# Patient Record
Sex: MALE | Race: WHITE | NOT HISPANIC OR LATINO | Employment: OTHER | URBAN - METROPOLITAN AREA
[De-identification: names, ages, dates, MRNs, and addresses within clinical notes are randomized per-mention and may not be internally consistent; named-entity substitution may affect disease eponyms.]

---

## 2017-02-27 ENCOUNTER — HOSPITAL ENCOUNTER (EMERGENCY)
Facility: CLINIC | Age: 57
Discharge: HOME | End: 2017-02-27
Attending: NURSE PRACTITIONER | Admitting: NURSE PRACTITIONER
Payer: COMMERCIAL

## 2017-02-27 ENCOUNTER — APPOINTMENT (OUTPATIENT)
Dept: CT IMAGING | Facility: CLINIC | Age: 57
End: 2017-02-27
Attending: NURSE PRACTITIONER
Payer: COMMERCIAL

## 2017-02-27 VITALS
OXYGEN SATURATION: 95 % | RESPIRATION RATE: 8 BRPM | SYSTOLIC BLOOD PRESSURE: 124 MMHG | WEIGHT: 240 LBS | DIASTOLIC BLOOD PRESSURE: 83 MMHG | HEART RATE: 105 BPM | BODY MASS INDEX: 33.6 KG/M2 | HEIGHT: 71 IN | TEMPERATURE: 98.2 F

## 2017-02-27 DIAGNOSIS — F17.200 TOBACCO USE DISORDER: ICD-10-CM

## 2017-02-27 DIAGNOSIS — R07.9 CHEST PAIN, UNSPECIFIED TYPE: ICD-10-CM

## 2017-02-27 LAB
ANION GAP SERPL CALCULATED.3IONS-SCNC: 10 MMOL/L (ref 3–14)
BASOPHILS # BLD AUTO: 0.1 10E9/L (ref 0–0.2)
BASOPHILS NFR BLD AUTO: 0.5 %
BUN SERPL-MCNC: 9 MG/DL (ref 7–30)
CALCIUM SERPL-MCNC: 9.2 MG/DL (ref 8.5–10.1)
CHLORIDE SERPL-SCNC: 109 MMOL/L (ref 94–109)
CO2 SERPL-SCNC: 23 MMOL/L (ref 20–32)
CREAT SERPL-MCNC: 0.76 MG/DL (ref 0.66–1.25)
DIFFERENTIAL METHOD BLD: ABNORMAL
EOSINOPHIL # BLD AUTO: 0.1 10E9/L (ref 0–0.7)
EOSINOPHIL NFR BLD AUTO: 0.5 %
ERYTHROCYTE [DISTWIDTH] IN BLOOD BY AUTOMATED COUNT: 12.2 % (ref 10–15)
GFR SERPL CREATININE-BSD FRML MDRD: ABNORMAL ML/MIN/1.7M2
GLUCOSE SERPL-MCNC: 120 MG/DL (ref 70–99)
HCT VFR BLD AUTO: 44.9 % (ref 40–53)
HGB BLD-MCNC: 16.9 G/DL (ref 13.3–17.7)
IMM GRANULOCYTES # BLD: 0.1 10E9/L (ref 0–0.4)
IMM GRANULOCYTES NFR BLD: 0.5 %
INTERPRETATION ECG - MUSE: NORMAL
LYMPHOCYTES # BLD AUTO: 1 10E9/L (ref 0.8–5.3)
LYMPHOCYTES NFR BLD AUTO: 9.3 %
MCH RBC QN AUTO: 31.9 PG (ref 26.5–33)
MCHC RBC AUTO-ENTMCNC: 37.6 G/DL (ref 31.5–36.5)
MCV RBC AUTO: 85 FL (ref 78–100)
MONOCYTES # BLD AUTO: 0.5 10E9/L (ref 0–1.3)
MONOCYTES NFR BLD AUTO: 4.1 %
NEUTROPHILS # BLD AUTO: 9.3 10E9/L (ref 1.6–8.3)
NEUTROPHILS NFR BLD AUTO: 85.1 %
NRBC # BLD AUTO: 0 10*3/UL
NRBC BLD AUTO-RTO: 0 /100
PLATELET # BLD AUTO: 260 10E9/L (ref 150–450)
POTASSIUM SERPL-SCNC: 4 MMOL/L (ref 3.4–5.3)
RBC # BLD AUTO: 5.3 10E12/L (ref 4.4–5.9)
SODIUM SERPL-SCNC: 142 MMOL/L (ref 133–144)
TROPONIN I SERPL-MCNC: NORMAL UG/L (ref 0–0.04)
TROPONIN I SERPL-MCNC: NORMAL UG/L (ref 0–0.04)
WBC # BLD AUTO: 10.9 10E9/L (ref 4–11)

## 2017-02-27 PROCEDURE — 71260 CT THORAX DX C+: CPT

## 2017-02-27 PROCEDURE — 84484 ASSAY OF TROPONIN QUANT: CPT | Performed by: NURSE PRACTITIONER

## 2017-02-27 PROCEDURE — 85025 COMPLETE CBC W/AUTO DIFF WBC: CPT | Performed by: NURSE PRACTITIONER

## 2017-02-27 PROCEDURE — 99285 EMERGENCY DEPT VISIT HI MDM: CPT | Mod: 25

## 2017-02-27 PROCEDURE — 93005 ELECTROCARDIOGRAM TRACING: CPT

## 2017-02-27 PROCEDURE — 25500064 ZZH RX 255 OP 636: Performed by: NURSE PRACTITIONER

## 2017-02-27 PROCEDURE — 80048 BASIC METABOLIC PNL TOTAL CA: CPT | Performed by: NURSE PRACTITIONER

## 2017-02-27 PROCEDURE — 25000125 ZZHC RX 250: Performed by: NURSE PRACTITIONER

## 2017-02-27 RX ORDER — IOPAMIDOL 755 MG/ML
82 INJECTION, SOLUTION INTRAVASCULAR ONCE
Status: COMPLETED | OUTPATIENT
Start: 2017-02-27 | End: 2017-02-27

## 2017-02-27 RX ADMIN — IOPAMIDOL 82 ML: 755 INJECTION, SOLUTION INTRAVENOUS at 16:40

## 2017-02-27 RX ADMIN — SODIUM CHLORIDE 103 ML: 9 INJECTION, SOLUTION INTRAVENOUS at 16:40

## 2017-02-27 ASSESSMENT — ENCOUNTER SYMPTOMS
NUMBNESS: 1
VOMITING: 0
BACK PAIN: 1
ABDOMINAL PAIN: 0
DIAPHORESIS: 0
SHORTNESS OF BREATH: 1
NAUSEA: 0
COUGH: 0
FEVER: 0

## 2017-02-27 NOTE — DISCHARGE INSTRUCTIONS
Discharge Instructions  Chest Pain    You have been seen today for chest pain or discomfort.  At this time, your doctor has found no signs that your chest pain is due to a serious or life-threatening condition, (or you have declined more testing and/or admission to the hospital). However, sometimes there is a serious problem that does not show up right away. Your evaluation today may not be complete and you may need further testing and evaluation.     You need to follow-up with your regular doctor within 3 days.    Return to the Emergency Department if:    Your chest pain changes, gets worse, starts to happen more often, or comes with less activity.    You are short of breath.    You get very weak or tired.    You pass out or faint.    You have any new symptoms, like fever, cough, numb legs, or you cough up blood.    You have anything else that worries you.    Until you follow-up with your regular doctor please do the following:    Take one aspirin daily unless you have an allergy or are told not to by your doctor.    If a stress test appointment has been made, go to the appointment.    If you have questions, contact your regular doctor.    If your doctor today has told you to follow-up with your regular doctor, it is very important that you make an appointment with your clinic and go to the appointment.  If you do not follow-up with your primary doctor, it may result in missing an important development which could result in permanent injury or disability and/or lasting pain.  If there is any problem keeping your appointment, call your doctor or return to the Emergency Department.    If you were given a prescription for medicine here today, be sure to read all of the information (including the package insert) that comes with your prescription.  This will include important information about the medicine, its side effects, and any warnings that you need to know about.  The pharmacist who fills the prescription can  provide more information and answer questions you may have about the medicine.  If you have questions or concerns that the pharmacist cannot address, please call or return to the Emergency Department.     Remember that you can always come back to the Emergency Department if you are not able to see your regular doctor in the amount of time listed above, if you get any new symptoms, or if there is anything that worries you.

## 2017-02-27 NOTE — ED AVS SNAPSHOT
Emergency Department    [address] [place] [address]    Phone:  [phone]    Fax:  [phone]                                       Pierce Sullivan   MRN: [id number]    Department:   Emergency Department   Date of Visit:  2/27/2017           After Visit Summary Signature Page     I have received my discharge instructions, and my questions have been answered. I have discussed any challenges I see with this plan with the nurse or doctor.    ..........................................................................................................................................  Patient/Patient Representative Signature      ..........................................................................................................................................  Patient Representative Print Name and Relationship to Patient    ..................................................               ................................................  Date                                            Time    ..........................................................................................................................................  Reviewed by Signature/Title    ...................................................              ..............................................  Date                                                            Time

## 2017-02-27 NOTE — ED PROVIDER NOTES
History     Chief Complaint:  Chest pain     HPI   Pierce Sullivan is a 56 year old male who presents with waxing and waning sharp left sided chest pain piercing to through the back with associated shortness of breath since late last night. There are no exacerbating or alleviating factors. Additionally, the patient notes a newer onset of numbness with associated tingling in bilateral feet. To note, the patient was recently on a vacation in [place] and returned approximately 10 days ago via plane. The patient does not report any leg swelling, diaphoresis, abdominal pain, nausea, vomiting, fevers, changes in ongoing cough, or other related symptoms. He voices no other concerns at this time. He did take a full strength ASA early this morning repeated by one 81mg ASA later in the morning.     Cardiac/PE/DVT Risk Factors:  The patient has no history of hypertension, hyperlipidemia, diabetes, but he is a smoker. He reports a family history of atrial fibrillation. The patient doesn't have any personal or familial history of PE, DVT, or clotting disorder. The patient reports no recent travel, surgery, or other immobilizations.       Allergies:  Penicillins      Medications:    Norco  Zantac  Percocet   Albuterol  Allegra      Past Medical History:    Cervicalgia   Asthma     Past Surgical History:    Quadricept tear repair   Hernia repair     Family History:    Atrial fibrillation (mother)    Social History:  The patient smokes 1.00 ppd. The patient does not use alcohol.   Marital Status:   [2]     Review of Systems   Constitutional: Negative for diaphoresis and fever.   Respiratory: Positive for shortness of breath. Negative for cough.    Cardiovascular: Positive for chest pain. Negative for leg swelling.   Gastrointestinal: Negative for abdominal pain, nausea and vomiting.   Musculoskeletal: Positive for back pain.   Neurological: Positive for numbness.   All other systems reviewed and are negative.      Physical  "Exam     Patient Vitals for the past 24 hrs:   BP Temp Temp src Pulse Heart Rate Resp SpO2 Height Weight   02/27/17 1730 124/83 - - - 61 8 - - -   02/27/17 1715 - - - - 60 12 95 % - -   02/27/17 1700 133/85 - - - 63 17 98 % - -   02/27/17 1630 - - - - 77 11 - - -   02/27/17 1615 111/76 - - - 69 8 96 % - -   02/27/17 1600 125/81 - - - 64 12 96 % - -   02/27/17 1545 125/88 - - - 67 17 94 % - -   02/27/17 1530 139/89 - - - 85 12 94 % - -   02/27/17 1515 (!) 133/95 - - - 68 16 100 % - -   02/27/17 1506 - - - - - - 96 % - -   02/27/17 1505 133/88 - - - 73 10 - - -   02/27/17 1400 - - - - 87 - 92 % - -   02/27/17 1322 (!) 157/93 - - - 80 13 95 % - -   02/27/17 1311 (!) 156/112 - - - 100 15 - - -   02/27/17 1305 (!) 160/104 98.2  F (36.8  C) Oral 105 - 20 96 % 1.803 m (5' 11\") 108.9 kg (240 lb)      Physical Exam  Physical Exam   Constitutional:   Non toxic appearing. Speaking in full sentences.   Head: Head moves freely with normal range of motion.   ENT: Oropharynx is clear and moist.   Eyes: Conjunctivae pink. EOMs intact.   Neck: Normal range of motion.   Cardiovascular: Normal heart sounds. No concerning murmur. Intact distal pulses: radial pulses 2+ on the right, 2+ on the left. Tachycardic but regular.   Pulmonary/Chest: No respiratory distress. No decreased breath sounds. No wheezes. No rhonchi. No rales. Lungs clear throughout. No obvious dyspnea at rest.  Abdominal: Soft. Non-tender. No rebound, no guarding.   Musculoskeletal: No peripheral edema. Distal capillary refill and sensation intact. No chest wall tenderness to palpation.   Neurological: Oriented to person, place, and time. No focal deficits.   Skin: Skin is warm and pink in color. No rash noted.      Emergency Department Course   ECG (13:02:39):  Rate 106 bpm. CA interval 152. QRS duration 82. QT/QTc 334/443. P-R-T axes 48/63/58. Sinus tachycardia cannot rule out anterior infarct, age undetermined. Interpreted at 1310 by Debra Rios NP*. "     Imaging:  Chest CT, IV contrast only - PE protocol: 1. No visualized pulmonary embolus. 2. No evidence of active pulmonary disease. 3. Nonspecific mildly enlarged right hilar lymph node. Readings per radiology.     Laboratory:  CBC: WNL (WBC 10.9, HGB 16.9, )   BMP: WNL (Creatinine 0.76)  1325 Troponin: WNL <0.015  1655 Troponin: WNL <0.015    Emergency Department Course:  Past medical records, nursing notes, and vitals reviewed. I performed an exam of the patient and obtained history, as documented above. Blood was obtained. A peripheral IV was established in the AC for CT Chest.     ASA 324mg PTA per patient.     Findings and plan explained to the Patient. Patient discharged home with instructions regarding supportive care, medications, and reasons to return. The importance of close follow-up was reviewed. The patient was advised to take 324mg ASA daily until f/u with PCP and stress test completed.      Impression & Plan      Medical Decision Making:  Pierce Sullivan is a 56 year old male with onset of intermittent non exertional left sided CP today. He is a 1ppd smoker and has no hx of HTN, hyperlipidemia personal or family hx of cardiac disease. EKG with sinus tachycardia and he did recently return from a 3 hour flight from [place]. No leg swelling. CT Chest negative for PE or aortic dissection. Troponin negative x 2, 3 hours apart. Given spontaneous resolution of CP, it's intermittent nature and negative work-up here I feel that he is safe to discharge home and f/u with stress echo this week. We reviewed at length reasons to return here and need for clinic follow up. Pt and wife amenable to plan.     Diagnosis:    ICD-10-CM    1. Chest pain, unspecified type R07.9 Exercise Stress Echocardiogram   2. Tobacco use disorder F17.200         Disposition:  discharged to home    Discharge Medication:  Discharge Medication List as of 2/27/2017  6:03 PM          IAmalia, am serving as a scribe  at 1:13 PM on 2/27/2017 to document services personally performed by Debra Rios NP based on my observations and the provider's statements to me.           Debra Rios, APRN CNP  02/27/17 3574

## 2017-02-27 NOTE — ED AVS SNAPSHOT
Emergency Department    [address] [facility] [address]    Phone:  [phone]    Fax:  [phone]                                       Pierce Sullivan   MRN: [id number]    Department:   Emergency Department   Date of Visit:  2/27/2017           Patient Information     Date Of Birth          1960        Your diagnoses for this visit were:     Chest pain, unspecified type     Tobacco use disorder        You were seen by Debra Rios APRN CNP.      Follow-up Information     Follow up with Clinic, [facility] In 4 days.    Why:  as scheduled    Contact information:    [address] [facility], [address]  [facility] [address]  [phone]          Discharge Instructions       Discharge Instructions  Chest Pain    You have been seen today for chest pain or discomfort.  At this time, your doctor has found no signs that your chest pain is due to a serious or life-threatening condition, (or you have declined more testing and/or admission to the hospital). However, sometimes there is a serious problem that does not show up right away. Your evaluation today may not be complete and you may need further testing and evaluation.     You need to follow-up with your regular doctor within 3 days.    Return to the Emergency Department if:    Your chest pain changes, gets worse, starts to happen more often, or comes with less activity.    You are short of breath.    You get very weak or tired.    You pass out or faint.    You have any new symptoms, like fever, cough, numb legs, or you cough up blood.    You have anything else that worries you.    Until you follow-up with your regular doctor please do the following:    Take one aspirin daily unless you have an allergy or are told not to by your doctor.    If a stress test appointment has been made, go to the appointment.    If you have questions, contact your regular doctor.    If your doctor today has told you to follow-up  with your regular doctor, it is very important that you make an appointment with your clinic and go to the appointment.  If you do not follow-up with your primary doctor, it may result in missing an important development which could result in permanent injury or disability and/or lasting pain.  If there is any problem keeping your appointment, call your doctor or return to the Emergency Department.    If you were given a prescription for medicine here today, be sure to read all of the information (including the package insert) that comes with your prescription.  This will include important information about the medicine, its side effects, and any warnings that you need to know about.  The pharmacist who fills the prescription can provide more information and answer questions you may have about the medicine.  If you have questions or concerns that the pharmacist cannot address, please call or return to the Emergency Department.     Remember that you can always come back to the Emergency Department if you are not able to see your regular doctor in the amount of time listed above, if you get any new symptoms, or if there is anything that worries you.          24 Hour Appointment Hotline       To make an appointment at any [facility], call [phone] ([phone]). If you don't have a family doctor or clinic, we will help you find one. [facility] clinics are conveniently located to serve the needs of you and your family.          ED Discharge Orders     Exercise Stress Echocardiogram       The supervising Cardiologist may change the type of echocardiogram requested to answer a specific clinical question based on existing protocols in the Echocardiography laboratory.    Use of contrast is at the discretion of the supervising Cardiologist.                     Review of your medicines      Our records show that you are taking the medicines listed below. If these are incorrect, please call your family doctor or  clinic.        Dose / Directions Last dose taken    ALBUTEROL INHALER 17GM   Quantity:  1 inhaler        1-2 puffs as needed   Refills:  0        ALLEGRA PO        2 TABLET TWICE DAILY   Refills:  0        ibuprofen 200 MG tablet   Commonly known as:  ADVIL/MOTRIN   Dose:  600 mg   Quantity:  1 tablet        Take 3 tablets by mouth every 8 hours as needed for pain.   Refills:  0        ZANTAC PO        Refills:  0                Procedures and tests performed during your visit     Basic metabolic panel    CBC with platelets differential    Chest CT, IV contrast only - PE protocol    EKG 12-lead, tracing only    Troponin I    Troponin I (now)      Orders Needing Specimen Collection     None      Pending Results     Date and Time Order Name Status Description    2/27/2017 1350 Chest CT, IV contrast only - PE protocol Preliminary             Pending Culture Results     No orders found from 2/25/2017 to 2/28/2017.             Test Results from your hospital stay     2/27/2017  2:17 PM - Interface, [email or web address] Results      Component Results     Component Value Ref Range & Units Status    WBC 10.9 4.0 - 11.0 10e9/L Final    RBC Count 5.30 4.4 - 5.9 10e12/L Final    Hemoglobin 16.9 13.3 - 17.7 g/dL Final    Hematocrit 44.9 40.0 - 53.0 % Final    MCV 85 78 - 100 fl Final    MCH 31.9 26.5 - 33.0 pg Final    MCHC 37.6 (H) 31.5 - 36.5 g/dL Final    RDW 12.2 10.0 - 15.0 % Final    Platelet Count 260 150 - 450 10e9/L Final    Diff Method Automated Method  Final    % Neutrophils 85.1 % Final    % Lymphocytes 9.3 % Final    % Monocytes 4.1 % Final    % Eosinophils 0.5 % Final    % Basophils 0.5 % Final    % Immature Granulocytes 0.5 % Final    Nucleated RBCs 0 0 /100 Final    Absolute Neutrophil 9.3 (H) 1.6 - 8.3 10e9/L Final    Absolute Lymphocytes 1.0 0.8 - 5.3 10e9/L Final    Absolute Monocytes 0.5 0.0 - 1.3 10e9/L Final    Absolute Eosinophils 0.1 0.0 - 0.7 10e9/L Final    Absolute Basophils 0.1 0.0 - 0.2 10e9/L Final    Abs  Immature Granulocytes 0.1 0 - 0.4 10e9/L Final    Absolute Nucleated RBC 0.0  Final         2/27/2017  1:49 PM - Interface, Flexilab Results      Component Results     Component Value Ref Range & Units Status    Sodium 142 133 - 144 mmol/L Final    Potassium 4.0 3.4 - 5.3 mmol/L Final    Chloride 109 94 - 109 mmol/L Final    Carbon Dioxide 23 20 - 32 mmol/L Final    Anion Gap 10 3 - 14 mmol/L Final    Glucose 120 (H) 70 - 99 mg/dL Final    Urea Nitrogen 9 7 - 30 mg/dL Final    Creatinine 0.76 0.66 - 1.25 mg/dL Final    GFR Estimate >90  Non  GFR Calc   >60 mL/min/1.7m2 Final    GFR Estimate If Black >90   GFR Calc   >60 mL/min/1.7m2 Final    Calcium 9.2 8.5 - 10.1 mg/dL Final         2/27/2017  1:53 PM - Interface, Flexilab Results      Component Results     Component Value Ref Range & Units Status    Troponin I ES  0.000 - 0.045 ug/L Final    <0.015  The 99th percentile for upper reference range is 0.045 ug/L.  Troponin values in   the range of 0.045 - 0.120 ug/L may be associated with risks of adverse   clinical events.           2/27/2017  4:58 PM - Interface, Radiant Ib      Narrative     CT CHEST WITH CONTRAST   2/27/2017 4:47 PM     HISTORY: Recent travel. Left-sided chest pain.    COMPARISON: None.    TECHNIQUE: Following the uneventful administration of 82 ml Isovue 370  intravenous contrast, helical sections were acquired through the lungs  according to the pulmonary embolism protocol. Coronal reconstructions  were generated. Radiation dose for this scan was reduced using  automated exposure control, adjustment of the mA and/or kV according  to the patient's size, or iterative reconstruction technique.    FINDINGS: No visualized pulmonary embolus. The thoracic aorta is  normal in caliber without dissection. Atherosclerotic calcification in  the thoracic aorta.    The lungs are clear. No pleural or pericardial effusion. Mildly  enlarged 1.8 x 1.4 cm right hilar lymph node  (series 4 image 60).    Scan through the upper abdomen is significant for mild diffuse fatty  infiltration of the liver.        Impression     IMPRESSION:   1. No visualized pulmonary embolus.  2. No evidence of active pulmonary disease.  3. Nonspecific mildly enlarged right hilar lymph node.         2/27/2017  5:34 PM - Interface, Flexilab Results      Component Results     Component Value Ref Range & Units Status    Troponin I ES  0.000 - 0.045 ug/L Final    <0.015  The 99th percentile for upper reference range is 0.045 ug/L.  Troponin values in   the range of 0.045 - 0.120 ug/L may be associated with risks of adverse   clinical events.                  Clinical Quality Measure: Blood Pressure Screening     Your blood pressure was checked while you were in the emergency department today. The last reading we obtained was  BP: 124/83 . Please read the guidelines below about what these numbers mean and what you should do about them.  If your systolic blood pressure (the top number) is less than 120 and your diastolic blood pressure (the bottom number) is less than 80, then your blood pressure is normal. There is nothing more that you need to do about it.  If your systolic blood pressure (the top number) is 120-139 or your diastolic blood pressure (the bottom number) is 80-89, your blood pressure may be higher than it should be. You should have your blood pressure rechecked within a year by a primary care provider.  If your systolic blood pressure (the top number) is 140 or greater or your diastolic blood pressure (the bottom number) is 90 or greater, you may have high blood pressure. High blood pressure is treatable, but if left untreated over time it can put you at risk for heart attack, stroke, or kidney failure. You should have your blood pressure rechecked by a primary care provider within the next 4 weeks.  If your provider in the emergency department today gave you specific instructions to follow-up with your  "doctor or provider even sooner than that, you should follow that instruction and not wait for up to 4 weeks for your follow-up visit.        Thank you for choosing [facility]       Thank you for choosing [facility] for your care. Our goal is always to provide you with excellent care. Hearing back from our patients is one way we can continue to improve our services. Please take a few minutes to complete the written survey that you may receive in the mail after you visit with us. Thank you!        [employer] Information     [employer] lets you send messages to your doctor, view your test results, renew your prescriptions, schedule appointments and more. To sign up, go to [email or web address] . Click on \"Log in\" on the left side of the screen, which will take you to the Welcome page. Then click on \"Sign up Now\" on the right side of the page.     You will be asked to enter the access code listed below, as well as some personal information. Please follow the directions to create your username and password.     Your access code is: [email or web address]  Expires: 2017  5:52 PM     Your access code will  in 90 days. If you need help or a new code, please call your [facility] clinic or [phone].        Care EveryWhere ID     This is your Care EveryWhere ID. This could be used by other organizations to access your [facility] medical records  [id number]        After Visit Summary       This is your record. Keep this with you and show to your community pharmacist(s) and doctor(s) at your next visit.                  "

## 2017-03-08 ENCOUNTER — HOSPITAL ENCOUNTER (OUTPATIENT)
Dept: CARDIOLOGY | Facility: CLINIC | Age: 57
Discharge: HOME | End: 2017-03-08
Attending: NURSE PRACTITIONER | Admitting: NURSE PRACTITIONER
Payer: COMMERCIAL

## 2017-03-08 DIAGNOSIS — R07.9 CHEST PAIN, UNSPECIFIED TYPE: ICD-10-CM

## 2017-03-08 PROCEDURE — 40000264 ECHO STRESS TEST WITH LUMASON

## 2017-03-08 PROCEDURE — 93325 DOPPLER ECHO COLOR FLOW MAPG: CPT | Mod: 26 | Performed by: INTERNAL MEDICINE

## 2017-03-08 PROCEDURE — 93321 DOPPLER ECHO F-UP/LMTD STD: CPT | Mod: 26 | Performed by: INTERNAL MEDICINE

## 2017-03-08 PROCEDURE — 25500064 ZZH RX 255 OP 636: Performed by: NURSE PRACTITIONER

## 2017-03-08 PROCEDURE — 93350 STRESS TTE ONLY: CPT | Mod: 26 | Performed by: INTERNAL MEDICINE

## 2017-03-08 PROCEDURE — 93016 CV STRESS TEST SUPVJ ONLY: CPT | Performed by: INTERNAL MEDICINE

## 2017-03-08 RX ADMIN — SULFUR HEXAFLUORIDE 5 ML: KIT at 10:30

## 2017-07-20 ENCOUNTER — HOSPITAL ENCOUNTER (EMERGENCY)
Facility: CLINIC | Age: 57
Discharge: LEFT BEFORE DISCHARGE | End: 2017-07-20
Admitting: EMERGENCY MEDICINE
Payer: COMMERCIAL

## 2017-07-20 VITALS
BODY MASS INDEX: 28.42 KG/M2 | WEIGHT: 203 LBS | HEIGHT: 71 IN | OXYGEN SATURATION: 98 % | TEMPERATURE: 97.7 F | SYSTOLIC BLOOD PRESSURE: 113 MMHG | DIASTOLIC BLOOD PRESSURE: 74 MMHG | RESPIRATION RATE: 16 BRPM

## 2017-07-20 PROCEDURE — 40000268 ZZH STATISTIC NO CHARGES

## 2018-01-23 ENCOUNTER — SURGERY (OUTPATIENT)
Age: 58
End: 2018-01-23

## 2018-01-23 ENCOUNTER — HOSPITAL ENCOUNTER (OUTPATIENT)
Facility: CLINIC | Age: 58
Discharge: HOME | End: 2018-01-23
Attending: COLON & RECTAL SURGERY | Admitting: COLON & RECTAL SURGERY
Payer: COMMERCIAL

## 2018-01-23 VITALS
BODY MASS INDEX: 27.86 KG/M2 | SYSTOLIC BLOOD PRESSURE: 120 MMHG | RESPIRATION RATE: 15 BRPM | HEIGHT: 71 IN | OXYGEN SATURATION: 97 % | WEIGHT: 199 LBS | DIASTOLIC BLOOD PRESSURE: 84 MMHG

## 2018-01-23 LAB — COLONOSCOPY: NORMAL

## 2018-01-23 PROCEDURE — 88305 TISSUE EXAM BY PATHOLOGIST: CPT | Mod: 26 | Performed by: COLON & RECTAL SURGERY

## 2018-01-23 PROCEDURE — 45385 COLONOSCOPY W/LESION REMOVAL: CPT | Performed by: COLON & RECTAL SURGERY

## 2018-01-23 PROCEDURE — 25000128 H RX IP 250 OP 636: Performed by: COLON & RECTAL SURGERY

## 2018-01-23 PROCEDURE — G0500 MOD SEDAT ENDO SERVICE >5YRS: HCPCS | Performed by: COLON & RECTAL SURGERY

## 2018-01-23 PROCEDURE — 88305 TISSUE EXAM BY PATHOLOGIST: CPT | Performed by: COLON & RECTAL SURGERY

## 2018-01-23 RX ORDER — FENTANYL CITRATE 50 UG/ML
INJECTION, SOLUTION INTRAMUSCULAR; INTRAVENOUS PRN
Status: DISCONTINUED | OUTPATIENT
Start: 2018-01-23 | End: 2018-01-23 | Stop reason: HOSPADM

## 2018-01-23 RX ORDER — ONDANSETRON 2 MG/ML
4 INJECTION INTRAMUSCULAR; INTRAVENOUS EVERY 6 HOURS PRN
Status: DISCONTINUED | OUTPATIENT
Start: 2018-01-23 | End: 2018-01-23 | Stop reason: HOSPADM

## 2018-01-23 RX ORDER — ONDANSETRON 4 MG/1
4 TABLET, ORALLY DISINTEGRATING ORAL EVERY 6 HOURS PRN
Status: DISCONTINUED | OUTPATIENT
Start: 2018-01-23 | End: 2018-01-23 | Stop reason: HOSPADM

## 2018-01-23 RX ORDER — FLUMAZENIL 0.1 MG/ML
0.2 INJECTION, SOLUTION INTRAVENOUS
Status: DISCONTINUED | OUTPATIENT
Start: 2018-01-23 | End: 2018-01-23 | Stop reason: HOSPADM

## 2018-01-23 RX ORDER — PROCHLORPERAZINE MALEATE 10 MG
10 TABLET ORAL EVERY 6 HOURS PRN
Status: DISCONTINUED | OUTPATIENT
Start: 2018-01-23 | End: 2018-01-23 | Stop reason: HOSPADM

## 2018-01-23 RX ORDER — LIDOCAINE 40 MG/G
CREAM TOPICAL
Status: DISCONTINUED | OUTPATIENT
Start: 2018-01-23 | End: 2018-01-23 | Stop reason: HOSPADM

## 2018-01-23 RX ORDER — NALOXONE HYDROCHLORIDE 0.4 MG/ML
.1-.4 INJECTION, SOLUTION INTRAMUSCULAR; INTRAVENOUS; SUBCUTANEOUS
Status: DISCONTINUED | OUTPATIENT
Start: 2018-01-23 | End: 2018-01-23 | Stop reason: HOSPADM

## 2018-01-23 RX ORDER — ONDANSETRON 2 MG/ML
4 INJECTION INTRAMUSCULAR; INTRAVENOUS
Status: DISCONTINUED | OUTPATIENT
Start: 2018-01-23 | End: 2018-01-23 | Stop reason: HOSPADM

## 2018-01-23 RX ADMIN — FENTANYL CITRATE 100 MCG: 50 INJECTION, SOLUTION INTRAMUSCULAR; INTRAVENOUS at 10:10

## 2018-01-23 RX ADMIN — FENTANYL CITRATE 50 MCG: 50 INJECTION, SOLUTION INTRAMUSCULAR; INTRAVENOUS at 10:20

## 2018-01-23 RX ADMIN — MIDAZOLAM 2 MG: 1 INJECTION INTRAMUSCULAR; INTRAVENOUS at 10:11

## 2018-01-23 NOTE — H&P
"Pre-Endoscopy History and Physical   Pierce Sullivan MRN# [id number]   YOB: 1960 Age: 57 year old   Date of Procedure: 2018   Primary care provider: Cam Henderson   Type of Endoscopy: colonoscopy   Reason for Procedure: personal history of polyps   Type of Anesthesia Anticipated: Moderate Sedation   HPI:   Pierce is a 57 year old male with a personal history of polyps.  He last had a colonoscopy in  where a 4mm cecal sessile serrated adenoma and a 11mm descending tubular adenoma were removed.  He denies BRBPR, abdominal pain, nausea/vomiting, changes in bowel habits or unintentional weight loss.  He denies a FH of CRC.    Allergies   Allergen Reactions     Bee Venom      Penicillins      As a child, but has had since      Prior to Admission Medications   Prescriptions Last Dose Informant Patient Reported? Taking?   ALBUTEROL INHALER 17GM More than a month  No No   Si-2 puffs as needed   ibuprofen (ADVIL,MOTRIN) 200 MG tablet 2018  No No   Sig: Take 3 tablets by mouth every 8 hours as needed for pain.      Facility-Administered Medications: None      Patient Active Problem List   Diagnosis     Cervicalgia      Past Medical History:   Diagnosis Date     Allergy      Arthritis     spine and neck     Asthma      Gastroesophageal reflux disease      Ulcer, gastric, acute      Urticaria     Gets hives unknown origin      Past Surgical History:   Procedure Laterality Date     COLONOSCOPY       HERNIA REPAIR       ORTHOPEDIC SURGERY      torn quadrecep repair     Quadracep Tear Right     Repair      Social History   Substance Use Topics     Smoking status: Current Every Day Smoker     Packs/day: 0.50     Years: 30.00     Types: Cigarettes     Smokeless tobacco: Never Used     Alcohol use No      History reviewed. No pertinent family history.   PHYSICAL EXAM:   BP (!) 138/91  Resp 14  Ht 1.803 m (5' 11\")  Wt 90.3 kg (199 lb)  SpO2 98%  BMI 27.75 kg/m2 Estimated body mass index is " "27.75 kg/(m^2) as calculated from the following:    Height as of this encounter: 1.803 m (5' 11\").    Weight as of this encounter: 90.3 kg (199 lb).   RESP: lungs clear to auscultation - no rales, rhonchi or wheezes   CV: regular rates and rhythm   ASA Class 2 - Mild systemic disease    Assessment: 58 y/o gentleman with a personal history of polyps    Plan: Colonoscopy with moderate sedation.  Risks of the procedure were discussed including, but not limited to, bleeding, perforation and missed lesions.  Patient understands and is willing to proceed.    Roberto Gaytan MD ....................  1/23/2018   10:08 AM  Colon and Rectal Surgery Staff  [phone]    "

## 2018-01-24 LAB — COPATH REPORT: NORMAL

## 2018-04-19 ENCOUNTER — HOSPITAL ENCOUNTER (EMERGENCY)
Facility: CLINIC | Age: 58
Discharge: HOME | End: 2018-04-19
Attending: EMERGENCY MEDICINE | Admitting: EMERGENCY MEDICINE
Payer: COMMERCIAL

## 2018-04-19 VITALS
HEIGHT: 71 IN | BODY MASS INDEX: 28 KG/M2 | OXYGEN SATURATION: 95 % | WEIGHT: 200 LBS | TEMPERATURE: 98.5 F | SYSTOLIC BLOOD PRESSURE: 153 MMHG | HEART RATE: 82 BPM | RESPIRATION RATE: 16 BRPM | DIASTOLIC BLOOD PRESSURE: 97 MMHG

## 2018-04-19 DIAGNOSIS — M54.12 CERVICAL RADICULOPATHY: ICD-10-CM

## 2018-04-19 DIAGNOSIS — F10.920 ALCOHOLIC INTOXICATION WITHOUT COMPLICATION (H): ICD-10-CM

## 2018-04-19 LAB — ALCOHOL BREATH TEST: 0.18 (ref 0–0.01)

## 2018-04-19 PROCEDURE — 99283 EMERGENCY DEPT VISIT LOW MDM: CPT

## 2018-04-19 PROCEDURE — 82075 ASSAY OF BREATH ETHANOL: CPT

## 2018-04-19 RX ORDER — NICOTINE 21 MG/24HR
1 PATCH, TRANSDERMAL 24 HOURS TRANSDERMAL ONCE
Status: DISCONTINUED | OUTPATIENT
Start: 2018-04-19 | End: 2018-04-19

## 2018-04-19 RX ORDER — HYDROCODONE BITARTRATE AND ACETAMINOPHEN 5; 325 MG/1; MG/1
1-2 TABLET ORAL EVERY 4 HOURS PRN
Qty: 15 TABLET | Refills: 0 | Status: SHIPPED | OUTPATIENT
Start: 2018-04-19 | End: 2018-06-14

## 2018-04-19 ASSESSMENT — ENCOUNTER SYMPTOMS
BACK PAIN: 1
NUMBNESS: 1

## 2018-04-19 NOTE — ED AVS SNAPSHOT
Emergency Department    [address] [facility] [address]    Phone:  [phone]    Fax:  [phone]                                       Pierce Sullivan   MRN: [id number]    Department:   Emergency Department   Date of Visit:  4/19/2018           Patient Information     Date Of Birth          1960        Your diagnoses for this visit were:     Alcoholic intoxication without complication (H)     Cervical radiculopathy        You were seen by Red Dent MD.      Follow-up Information     Schedule an appointment as soon as possible for a visit with Cam Henderson PA-C.    Specialty:  Physician Assistant    Contact information:    [address]  [address]  [facility] [id number] [phone]          Follow up with  Emergency Department.    Specialty:  EMERGENCY MEDICINE    Why:  If symptoms worsen    Contact information:    [address] [facility] [address] [phone]      Discharge References/Attachments     CERVICAL RADICULOPATHY, UNDERSTANDING (ENGLISH)    ALCOHOL INTOXICATION (ENGLISH)      24 Hour Appointment Hotline       To make an appointment at any [facility], call [phone] ([phone]). If you don't have a family doctor or clinic, we will help you find one. [facility] are conveniently located to serve the needs of you and your family.             Review of your medicines      START taking        Dose / Directions Last dose taken    HYDROcodone-acetaminophen 5-325 MG per tablet   Commonly known as:  NORCO   Dose:  1-2 tablet   Quantity:  15 tablet        Take 1-2 tablets by mouth every 4 hours as needed for pain   Refills:  0          Our records show that you are taking the medicines listed below. If these are incorrect, please call your family doctor or clinic.        Dose / Directions Last dose taken    EPINEPHrine 0.3 MG/0.3ML injection 2-pack   Commonly known as:  EPIPEN/ADRENACLICK/or ANY BX  GENERIC EQUIV        Inject into the muscle as needed   Refills:  0        ibuprofen 200 MG tablet   Commonly known as:  ADVIL/MOTRIN   Dose:  600 mg   Quantity:  1 tablet        Take 3 tablets by mouth every 8 hours as needed for pain.   Refills:  0        ranitidine 150 MG tablet   Commonly known as:  ZANTAC   Dose:  150 mg        Take 150 mg by mouth 2 times daily   Refills:  0        VENTOLIN  (90 Base) MCG/ACT Inhaler   Dose:  2 puff   Generic drug:  albuterol        Inhale 2 puffs into the lungs 4 times daily as needed   Refills:  0                Information about OPIOIDS     PRESCRIPTION OPIOIDS: WHAT YOU NEED TO KNOW   You have a prescription for an opioid (narcotic) pain medicine. Opioids can cause addiction. If you have a history of chemical dependency of any type, you are at a higher risk of becoming addicted to opioids. Only take this medicine after all other options have been tried. Take it for as short a time and as few doses as possible.     Do not:    Drive. If you drive while taking these medicines, you could be arrested for driving under the influence (DUI).    Operate heavy machinery    Do any other dangerous activities while taking these medicines.     Drink any alcohol while taking these medicines.      Take with any other medicines that contain acetaminophen. Read all labels carefully. Look for the word  acetaminophen  or  Tylenol.  Ask your pharmacist if you have questions or are unsure.    Store your pills in a secure place, locked if possible. We will not replace any lost or stolen medicine. If you don t finish your medicine, please throw away (dispose) as directed by your pharmacist. The [place] [employer]ution [employer] has more information about safe disposal: https://www.pca.[facility].[place].us/living-green/managing-unwanted-medications    All opioids tend to cause constipation. Drink plenty of water and eat foods that have a lot of fiber, such as fruits, vegetables, prune juice, apple  juice and high-fiber cereal. Take a laxative (Miralax, milk of magnesia, Colace, Senna) if you don t move your bowels at least every other day.         Prescriptions were sent or printed at these locations (1 Prescription)                   Other Prescriptions                Printed at Department/Unit printer (1 of 1)         HYDROcodone-acetaminophen (NORCO) 5-325 MG per tablet                Procedures and tests performed during your visit     Alcohol breath test POCT      Orders Needing Specimen Collection     None      Pending Results     No orders found from 4/17/2018 to 4/20/2018.            Pending Culture Results     No orders found from 4/17/2018 to 4/20/2018.            Pending Results Instructions     If you had any lab results that were not finalized at the time of your Discharge, you can call the ED Lab Result RN at [phone]. You will be contacted by this team for any positive Lab results or changes in treatment. The nurses are available 7 days a week from 10A to 6:30P.  You can leave a message 24 hours per day and they will return your call.        Test Results From Your Hospital Stay        4/19/2018  3:47 PM      Component Results     Component Value Ref Range & Units Status    Alcohol Breath Test 0.182 (A) 0.00 - 0.01 Final                Clinical Quality Measure: Blood Pressure Screening     Your blood pressure was checked while you were in the emergency department today. The last reading we obtained was  BP: (!) 153/97 . Please read the guidelines below about what these numbers mean and what you should do about them.  If your systolic blood pressure (the top number) is less than 120 and your diastolic blood pressure (the bottom number) is less than 80, then your blood pressure is normal. There is nothing more that you need to do about it.  If your systolic blood pressure (the top number) is 120-139 or your diastolic blood pressure (the bottom number) is 80-89, your blood pressure may be higher  "than it should be. You should have your blood pressure rechecked within a year by a primary care provider.  If your systolic blood pressure (the top number) is 140 or greater or your diastolic blood pressure (the bottom number) is 90 or greater, you may have high blood pressure. High blood pressure is treatable, but if left untreated over time it can put you at risk for heart attack, stroke, or kidney failure. You should have your blood pressure rechecked by a primary care provider within the next 4 weeks.  If your provider in the emergency department today gave you specific instructions to follow-up with your doctor or provider even sooner than that, you should follow that instruction and not wait for up to 4 weeks for your follow-up visit.        Thank you for choosing [facility]       Thank you for choosing [facility] for your care. Our goal is always to provide you with excellent care. Hearing back from our patients is one way we can continue to improve our services. Please take a few minutes to complete the written survey that you may receive in the mail after you visit with us. Thank you!        [employer] Information     [employer] lets you send messages to your doctor, view your test results, renew your prescriptions, schedule appointments and more. To sign up, go to [email or web address] . Click on \"Log in\" on the left side of the screen, which will take you to the Welcome page. Then click on \"Sign up Now\" on the right side of the page.     You will be asked to enter the access code listed below, as well as some personal information. Please follow the directions to create your username and password.     Your access code is: [email or web address]  Expires: 2018  1:39 PM     Your access code will  in 90 days. If you need help or a new code, please call your [facility] clinic or [phone].        Care EveryWhere ID     This is your Care EveryWhere ID. This could be used by other organizations to access your " [facility] medical records  [id number]        Equal Access to Services     CIRILO JAIMES : Hadii jaki Martinez, thomas ayala, cecy ayala, paco morrison. So [facility] [phone].    ATENCIÓN: Si habla español, tiene a collier disposición servicios gratuitos de asistencia lingüística. Llame al [phone].    We comply with applicable federal civil rights laws and [place] laws. We do not discriminate on the basis of race, color, national origin, age, disability, sex, sexual orientation, or gender identity.            After Visit Summary       This is your record. Keep this with you and show to your community pharmacist(s) and doctor(s) at your next visit.

## 2018-04-19 NOTE — ED PROVIDER NOTES
"  History     Chief Complaint:  Alcohol Intoxication    HPI   Pierce Sullivan is a 57 year old male, with a history of alcoholism, alcohol withdrawal seizures, cervicalgia, and arthritis, who presents with his AA friend to the ED for evaluation of alcohol intoxication. Per nurse's report, the patient has been drinking about 1.5 bottles of vodka per day. His last drink was at noon. Upon evaluation, the patient reports he has a history of alcoholism and withdrawal seizures. He had been sober for 10 years. He began drinking for the past week due to pain. He has right arm and fingers numbness with upper back pain. His pain feels like \"a knife in my back\" and \"I'm sleeping on rocks.\" He recently had a neck MRI performed at [facility] which showed severe arthritis per his report. He has an appointment next week with a back specialist. The patient notes he took his last prescribed pain killer today. The patient denies any other substance use.      Allergies:  Penicillins       Medications:    Zantac  Ventolin inhaler     Past Medical History:    Cervicalgia   Asthma   Arthritis  GERD  Gastric ulcer  Urticaria  Alcoholism  Alcohol withdrawal seizure      Past Surgical History:    Right quadriceps tear repair   Hernia repair     Family History:    History reviewed. No pertinent family history.     Social History:  Smoking status: Current every day smoker  Alcohol use: Yes, 1.5 bottles vodka/day, history of alcoholism, sobriety 10 years   Presents to ED with AA friend    Marital Status:   [2]     Review of Systems   Musculoskeletal: Positive for back pain.   Neurological: Positive for numbness.   All other systems reviewed and are negative.    Physical Exam     Patient Vitals for the past 24 hrs:   BP Temp Pulse Heart Rate Resp SpO2 Height Weight   04/19/18 1720 - - - 85 16 95 % - -   04/19/18 1534 (!) 153/97 98.5  F (36.9  C) 82 82 16 94 % 1.803 m (5' 11\") 90.7 kg (200 lb)     Physical Exam  Nursing note and " vitals reviewed.  Constitutional:  Smells of alcohol. Oriented to person, place, and time. Cooperative.   HENT:   Nose:    Nose normal.   Mouth/Throat:   Mucous membranes are normal.   Eyes:    Conjunctivae normal and EOM are normal.      Pupils are equal, round, and reactive to light.   Neck:    Trachea normal.   Cardiovascular:  Normal rate, regular rhythm, normal heart sounds and normal pulses. No murmur heard.  Pulmonary/Chest:  Effort normal and breath sounds normal.   Abdominal:   Soft. Normal appearance and bowel sounds are normal.      There is no tenderness.      There is no rebound and no CVA tenderness.   Musculoskeletal:  Extremities atraumatic x 4.   Lymphadenopathy:  No cervical adenopathy.   Neurological:   Decreased sensation to light touch in fingertips of right index and middle fingers. Sensation intact elsewhere. Alert and oriented to person, place, and time.      No cranial nerve deficit or sensory deficit. GCS eye subscore is 4. GCS verbal subscore is 5. GCS motor subscore is 6. No tremors present.  Skin:    Skin is intact. No rash noted.   Psychiatric:   Slightly agitated. Slightly depressed.     Emergency Department Course     Laboratory:  Alcohol breath test POCT: 0.182    Emergency Department Course:  Past medical records, nursing notes, and vitals reviewed.  1546: I performed an exam of the patient and obtained history, as documented above.    1700: I rechecked the patient. Explained plan with patient and friend.    Findings and plan explained to the Patient and friend. Patient discharged home with instructions regarding supportive care, medications, and reasons to return. The importance of close follow-up was reviewed.     Impression & Plan      Medical Decision Making:  This is a 57-year-old male who came in concerned that he would go into severe alcohol withdrawal based on his previous history.  Here for me, he does not appear to be in alcohol withdrawal.  He also does not appear overly  intoxicated.  I explained to him that the fact that he has only been drinking now for a week would make it less likely for him to go into severe withdrawal.  We looked for a detox bed for the patient, and the only place in [facility] with a detox bed was [address] [place] [address].  The patient was not willing to go there, which did not surprise me.  I tried to reassure him that he would probably be safe to go home with regards to the withdrawal.  However I am concerned about him continuing to drink due to his pain.  I suspect that he has a cervical radiculopathy and likely a herniated disc.  I discussed with him and his friend who brought him about whether or not he should go home with some pain medicine.  They both agree that they will give the prescription to his wife to manage, as I indicated I did not want him taking the pain medicine with alcohol.  However I also and hoping that he will discontinue drinking if he receives some pain medicine.  Therefore at this point he was discharged.  I instructed him to return if he develops any withdrawal symptoms.  He should follow-up with his primary physician as well.    Diagnosis:    ICD-10-CM   1. Alcoholic intoxication without complication (H) F10.920   2. Cervical radiculopathy M54.12     Disposition: Patient discharged with AA friend     Discharge Medications:   Details   HYDROcodone-acetaminophen (NORCO) 5-325 MG per tablet Take 1-2 tablets by mouth every 4 hours as needed for pain, Disp-15 tablet, R-0, Local Print     Yesi Souza  4/19/2018    EMERGENCY DEPARTMENT    I, Yesi Souza, am serving as a scribe at 4:46 PM on 4/19/2018 to document services personally performed by Red Dent MD based on my observations and the provider's statements to me.          Red Dent MD  04/19/18 5137

## 2018-04-19 NOTE — ED AVS SNAPSHOT
Emergency Department    [address] [place] [address]    Phone:  [phone]    Fax:  [phone]                                       Pierce Sullivan   MRN: [id number]    Department:   Emergency Department   Date of Visit:  4/19/2018           After Visit Summary Signature Page     I have received my discharge instructions, and my questions have been answered. I have discussed any challenges I see with this plan with the nurse or doctor.    ..........................................................................................................................................  Patient/Patient Representative Signature      ..........................................................................................................................................  Patient Representative Print Name and Relationship to Patient    ..................................................               ................................................  Date                                            Time    ..........................................................................................................................................  Reviewed by Signature/Title    ...................................................              ..............................................  Date                                                            Time

## 2018-06-14 ENCOUNTER — OFFICE VISIT (OUTPATIENT)
Dept: FAMILY MEDICINE | Facility: CLINIC | Age: 58
End: 2018-06-14
Payer: COMMERCIAL

## 2018-06-14 ENCOUNTER — RADIANT APPOINTMENT (OUTPATIENT)
Dept: GENERAL RADIOLOGY | Facility: CLINIC | Age: 58
End: 2018-06-14
Attending: INTERNAL MEDICINE
Payer: COMMERCIAL

## 2018-06-14 VITALS
BODY MASS INDEX: 28.14 KG/M2 | HEART RATE: 82 BPM | WEIGHT: 201 LBS | DIASTOLIC BLOOD PRESSURE: 98 MMHG | OXYGEN SATURATION: 96 % | HEIGHT: 71 IN | TEMPERATURE: 99.1 F | SYSTOLIC BLOOD PRESSURE: 153 MMHG

## 2018-06-14 DIAGNOSIS — I10 ESSENTIAL HYPERTENSION: ICD-10-CM

## 2018-06-14 DIAGNOSIS — F17.210 CIGARETTE NICOTINE DEPENDENCE WITHOUT COMPLICATION: ICD-10-CM

## 2018-06-14 DIAGNOSIS — G89.29 CHRONIC NECK AND BACK PAIN: ICD-10-CM

## 2018-06-14 DIAGNOSIS — M25.512 LEFT SHOULDER PAIN, UNSPECIFIED CHRONICITY: ICD-10-CM

## 2018-06-14 DIAGNOSIS — Z13.220 LIPID SCREENING: ICD-10-CM

## 2018-06-14 DIAGNOSIS — R31.9 HEMATURIA, UNSPECIFIED TYPE: ICD-10-CM

## 2018-06-14 DIAGNOSIS — M54.9 CHRONIC NECK AND BACK PAIN: ICD-10-CM

## 2018-06-14 DIAGNOSIS — Z00.00 ROUTINE HISTORY AND PHYSICAL EXAMINATION OF ADULT: Primary | ICD-10-CM

## 2018-06-14 DIAGNOSIS — M54.2 CHRONIC NECK AND BACK PAIN: ICD-10-CM

## 2018-06-14 DIAGNOSIS — Z12.5 SCREENING FOR PROSTATE CANCER: ICD-10-CM

## 2018-06-14 DIAGNOSIS — Z11.59 NEED FOR HEPATITIS C SCREENING TEST: ICD-10-CM

## 2018-06-14 LAB
ALBUMIN UR-MCNC: NEGATIVE MG/DL
APPEARANCE UR: CLEAR
BILIRUB UR QL STRIP: NEGATIVE
COLOR UR AUTO: YELLOW
GLUCOSE UR STRIP-MCNC: NEGATIVE MG/DL
HGB UR QL STRIP: ABNORMAL
KETONES UR STRIP-MCNC: NEGATIVE MG/DL
LEUKOCYTE ESTERASE UR QL STRIP: NEGATIVE
NITRATE UR QL: NEGATIVE
PH UR STRIP: 5.5 PH (ref 5–7)
RBC #/AREA URNS AUTO: NORMAL /HPF
SOURCE: ABNORMAL
SP GR UR STRIP: <=1.005 (ref 1–1.03)
UROBILINOGEN UR STRIP-ACNC: 0.2 EU/DL (ref 0.2–1)
WBC #/AREA URNS AUTO: NORMAL /HPF

## 2018-06-14 PROCEDURE — 86803 HEPATITIS C AB TEST: CPT | Performed by: INTERNAL MEDICINE

## 2018-06-14 PROCEDURE — 80061 LIPID PANEL: CPT | Performed by: INTERNAL MEDICINE

## 2018-06-14 PROCEDURE — 80048 BASIC METABOLIC PNL TOTAL CA: CPT | Performed by: INTERNAL MEDICINE

## 2018-06-14 PROCEDURE — 73030 X-RAY EXAM OF SHOULDER: CPT | Mod: LT

## 2018-06-14 PROCEDURE — 99386 PREV VISIT NEW AGE 40-64: CPT | Performed by: INTERNAL MEDICINE

## 2018-06-14 PROCEDURE — 81001 URINALYSIS AUTO W/SCOPE: CPT | Performed by: INTERNAL MEDICINE

## 2018-06-14 PROCEDURE — 36415 COLL VENOUS BLD VENIPUNCTURE: CPT | Performed by: INTERNAL MEDICINE

## 2018-06-14 PROCEDURE — G0103 PSA SCREENING: HCPCS | Performed by: INTERNAL MEDICINE

## 2018-06-14 RX ORDER — GABAPENTIN 300 MG/1
CAPSULE ORAL
Refills: 0 | COMMUNITY
Start: 2018-05-09 | End: 2018-07-03

## 2018-06-14 RX ORDER — OXYCODONE AND ACETAMINOPHEN 10; 325 MG/1; MG/1
1 TABLET ORAL EVERY 6 HOURS PRN
Qty: 60 TABLET | Refills: 0 | Status: SHIPPED | OUTPATIENT
Start: 2018-06-14 | End: 2018-07-03

## 2018-06-14 RX ORDER — EPINEPHRINE 0.3 MG/.3ML
0.3 INJECTION SUBCUTANEOUS PRN
COMMUNITY

## 2018-06-14 RX ORDER — VARENICLINE TARTRATE 1 MG/1
1 TABLET, FILM COATED ORAL 2 TIMES DAILY
Qty: 56 TABLET | Refills: 2 | Status: SHIPPED | OUTPATIENT
Start: 2018-06-14 | End: 2020-02-04

## 2018-06-14 RX ORDER — METHOCARBAMOL 750 MG/1
TABLET, FILM COATED ORAL
Refills: 0 | COMMUNITY
Start: 2018-05-30 | End: 2018-07-03

## 2018-06-14 RX ORDER — HYDROCHLOROTHIAZIDE 25 MG/1
25 TABLET ORAL DAILY
Qty: 30 TABLET | Refills: 3 | Status: SHIPPED | OUTPATIENT
Start: 2018-06-14 | End: 2020-02-04

## 2018-06-14 ASSESSMENT — ENCOUNTER SYMPTOMS
HEADACHES: 1
CONSTIPATION: 0
PALPITATIONS: 0
ARTHRALGIAS: 0
COUGH: 0
DIZZINESS: 0
BLOOD IN STOOL: 0
DYSURIA: 0
NUMBNESS: 1
NERVOUS/ANXIOUS: 0
ABDOMINAL PAIN: 0
WEAKNESS: 1
FATIGUE: 1
FEVER: 0
LIGHT-HEADEDNESS: 0
DIFFICULTY URINATING: 0
BACK PAIN: 1
HEMATURIA: 1
NAUSEA: 0
EYE PAIN: 0
VOMITING: 0
SLEEP DISTURBANCE: 1
MYALGIAS: 0
TROUBLE SWALLOWING: 0
CHILLS: 0
SORE THROAT: 0
DIARRHEA: 0
SHORTNESS OF BREATH: 1
NECK PAIN: 1

## 2018-06-14 NOTE — LETTER
[facility]  [address] [place] [address] [facility]  [address]  [place]  [address]  Tel: [phone]    June 18, 2018    Pierce Sullivan  [address] [address]        Good day to you Mr. Sullivan.     Your Hepatitis C and prostate cancer screening tests are normal.     The microscopic examination of your urine did not show any blood.     Your metabolic profile shows that your electrolytes, blood sugar (glucose), and kidney function are within normal limits.     Your cholesterol levels are excellent.  Keep up the good work.       Sincerely,    Charles Gardner MD/oskar    Results for orders placed or performed in visit on 06/14/18   Lipid panel reflex to direct LDL Fasting   Result Value Ref Range    Cholesterol 191 <200 mg/dL    Triglycerides 75 <150 mg/dL    HDL Cholesterol 78 >39 mg/dL    LDL Cholesterol Calculated 98 <100 mg/dL    Non HDL Cholesterol 113 <130 mg/dL   Hepatitis C antibody   Result Value Ref Range    Hepatitis C Antibody Nonreactive NR^Nonreactive   Prostate spec antigen screen   Result Value Ref Range    PSA 0.61 0 - 4 ug/L   Basic metabolic panel   Result Value Ref Range    Sodium 140 133 - 144 mmol/L    Potassium 3.9 3.4 - 5.3 mmol/L    Chloride 108 94 - 109 mmol/L    Carbon Dioxide 20 20 - 32 mmol/L    Anion Gap 12 3 - 14 mmol/L    Glucose 92 70 - 99 mg/dL    Urea Nitrogen 10 7 - 30 mg/dL    Creatinine 0.66 0.66 - 1.25 mg/dL    GFR Estimate >90 >60 mL/min/1.7m2    GFR Estimate If Black >90 >60 mL/min/1.7m2    Calcium 9.5 8.5 - 10.1 mg/dL   UA reflex to Microscopic and Culture   Result Value Ref Range    Color Urine Yellow     Appearance Urine Clear     Glucose Urine Negative NEG^Negative mg/dL    Bilirubin Urine Negative NEG^Negative    Ketones Urine Negative NEG^Negative mg/dL    Specific Gravity Urine <=1.005 1.003 - 1.035    Blood Urine Trace (A) NEG^Negative    pH Urine 5.5 5.0 - 7.0 pH    Protein Albumin Urine Negative NEG^Negative mg/dL    Urobilinogen Urine 0.2 0.2 - 1.0 EU/dL     Nitrite Urine Negative NEG^Negative    Leukocyte Esterase Urine Negative NEG^Negative    Source Midstream Urine    Urine Microscopic   Result Value Ref Range    WBC Urine 0 - 5 OTO5^0 - 5 /HPF    RBC Urine O - 2 OTO2^O - 2 /HPF           Enclosure: Lab Results

## 2018-06-14 NOTE — PATIENT INSTRUCTIONS
Take your blood pressure medication (hydrochlorothiazide) as directed.    Monitor your blood pressure twice a day (once you wake up and before bedtime).  Call doctor if:  -- your blood pressure for the top/upper number is greater than 140 or less than 90  -- your blood pressure for the bottom/lower number is greater than 90 or less than 60    Return to the clinic for a nurse-only visit in 1-2 weeks to recheck your blood pressure. Bring your blood pressure machine.    Call doctor if you develop any side effects from the medication/s.    Seek immediate medical attention if you develop marked weakness of the arms or legs, loss of bowel/urinary control, or numbness such at your groin.    Proceed with physical therapy for your left shoulder pain.    Follow up in 1 month.          Preventive Health Recommendations  Male Ages 50   64    Yearly exam:             See your health care provider every year in order to  o   Review health changes.   o   Discuss preventive care.    o   Review your medicines if your doctor has prescribed any.     Have a cholesterol test every 5 years, or more frequently if you are at risk for high cholesterol/heart disease.     Have a diabetes test (fasting glucose) every three years. If you are at risk for diabetes, you should have this test more often.     Have a colonoscopy at age 50, or have a yearly FIT test (stool test). These exams will check for colon cancer.      Talk with your health care provider about whether or not a prostate cancer screening test (PSA) is right for you.    You should be tested each year for STDs (sexually transmitted diseases), if you re at risk.     Shots: Get a flu shot each year. Get a tetanus shot every 10 years.     Nutrition:    Eat at least 5 servings of fruits and vegetables daily.     Eat whole-grain bread, whole-wheat pasta and brown rice instead of white grains and rice.     Talk to your provider about Calcium and Vitamin D.     Lifestyle    Exercise for at  least 150 minutes a week (30 minutes a day, 5 days a week). This will help you control your weight and prevent disease.     Limit alcohol to one drink per day.     No smoking.     Wear sunscreen to prevent skin cancer.     See your dentist every six months for an exam and cleaning.     See your eye doctor every 1 to 2 years.
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
## 2018-06-14 NOTE — MR AVS SNAPSHOT
After Visit Summary   6/14/2018    Pierce Sullivan    MRN: [id number]           Patient Information     Date Of Birth          1960        Visit Information        Provider Department      6/14/2018 4:00 PM Charles Gardner MD [facility]        Today's Diagnoses     Routine history and physical examination of adult    -  1    Chronic neck and back pain        Essential hypertension        Left shoulder pain, unspecified chronicity        Hematuria, unspecified type        Cigarette nicotine dependence without complication        Screening for prostate cancer        Lipid screening        Need for hepatitis C screening test          Care Instructions    Take your blood pressure medication (hydrochlorothiazide) as directed.    Monitor your blood pressure twice a day (once you wake up and before bedtime).  Call doctor if:  -- your blood pressure for the top/upper number is greater than 140 or less than 90  -- your blood pressure for the bottom/lower number is greater than 90 or less than 60    Return to the clinic for a nurse-only visit in 1-2 weeks to recheck your blood pressure. Bring your blood pressure machine.    Call doctor if you develop any side effects from the medication/s.    Seek immediate medical attention if you develop marked weakness of the arms or legs, loss of bowel/urinary control, or numbness such at your groin.    Proceed with physical therapy for your left shoulder pain.    Follow up in 1 month.          Preventive Health Recommendations  Male Ages 50 - 64    Yearly exam:             See your health care provider every year in order to  o   Review health changes.   o   Discuss preventive care.    o   Review your medicines if your doctor has prescribed any.     Have a cholesterol test every 5 years, or more frequently if you are at risk for high cholesterol/heart disease.     Have a diabetes test (fasting glucose) every three years. If you are at risk  for diabetes, you should have this test more often.     Have a colonoscopy at age 50, or have a yearly FIT test (stool test). These exams will check for colon cancer.      Talk with your health care provider about whether or not a prostate cancer screening test (PSA) is right for you.    You should be tested each year for STDs (sexually transmitted diseases), if you re at risk.     Shots: Get a flu shot each year. Get a tetanus shot every 10 years.     Nutrition:    Eat at least 5 servings of fruits and vegetables daily.     Eat whole-grain bread, whole-wheat pasta and brown rice instead of white grains and rice.     Talk to your provider about Calcium and Vitamin D.     Lifestyle    Exercise for at least 150 minutes a week (30 minutes a day, 5 days a week). This will help you control your weight and prevent disease.     Limit alcohol to one drink per day.     No smoking.     Wear sunscreen to prevent skin cancer.     See your dentist every six months for an exam and cleaning.     See your eye doctor every 1 to 2 years.            Follow-ups after your visit        Additional Services     [facility] PT, HAND, AND CHIROPRACTIC REFERRAL       **This order will print in the [facility] Scheduling Office**    Physical Therapy, Hand Therapy and Chiropractic Care are available through:    *[facility]  *[facility]  *[facility]    Call one number to schedule at any of the above locations: [phone].    Your provider has referred you to: Physical Therapy at [facility] or [facility]    Indication/Reason for Referral: Shoulder Pain  Onset of Illness: 1 month  Therapy Orders: Evaluate and Treat  Special Programs: as recommended by therapist    Special Request:     Kem Brownlee      Additional Comments for the Therapist or Chiropractor:     Please be aware that coverage of these services is subject to the terms and limitations of your health insurance plan.  Call member services at your health  "plan with any benefit or coverage questions.      Please bring the following to your appointment:    *Your personal calendar for scheduling future appointments  *Comfortable clothing                  Who to contact     If you have questions or need follow up information about today's clinic visit or your schedule please contact [facility] directly at [phone].  Normal or non-critical lab and imaging results will be communicated to you by MyChart, letter or phone within 4 business days after the clinic has received the results. If you do not hear from us within 7 days, please contact the clinic through MyChart or phone. If you have a critical or abnormal lab result, we will notify you by phone as soon as possible.  Submit refill requests through [employer] or call your pharmacy and they will forward the refill request to us. Please allow 3 business days for your refill to be completed.          Additional Information About Your Visit        MyChart Information     [employer] lets you send messages to your doctor, view your test results, renew your prescriptions, schedule appointments and more. To sign up, go to [email or web address] . Click on \"Log in\" on the left side of the screen, which will take you to the Welcome page. Then click on \"Sign up Now\" on the right side of the page.     You will be asked to enter the access code listed below, as well as some personal information. Please follow the directions to create your username and password.     Your access code is: [email or web address]  Expires: 2018  1:39 PM     Your access code will  in 90 days. If you need help or a new code, please call your [place] clinic or [phone].        Care EveryWhere ID     This is your Care EveryWhere ID. This could be used by other organizations to access your [place] medical records  [id number]        Your Vitals Were     Pulse Temperature Height Pulse Oximetry BMI (Body Mass Index)       82 99.1  F (37.3  C) " "(Tympanic) 5' 11\" (1.803 m) 96% 28.03 kg/m2        Blood Pressure from Last 3 Encounters:   06/14/18 (!) 153/98   04/19/18 (!) 153/97   01/23/18 120/84    Weight from Last 3 Encounters:   06/14/18 201 lb (91.2 kg)   04/19/18 200 lb (90.7 kg)   01/23/18 199 lb (90.3 kg)              We Performed the Following     Basic metabolic panel     Hepatitis C antibody     [facility] PT, HAND, AND CHIROPRACTIC REFERRAL     Lipid panel reflex to direct LDL Fasting     Prostate spec antigen screen     UA reflex to Microscopic and Culture          Today's Medication Changes          These changes are accurate as of 6/14/18  5:31 PM.  If you have any questions, ask your nurse or doctor.               Start taking these medicines.        Dose/Directions    Blood Pressure Monitoring Kit   Used for:  Essential hypertension   Started by:  Charles Gardner MD        Use as directed   Quantity:  1 kit   Refills:  0       hydrochlorothiazide 25 MG tablet   Commonly known as:  HYDRODIURIL   Used for:  Essential hypertension   Started by:  Charles Gardner MD        Dose:  25 mg   Take 1 tablet (25 mg) by mouth daily   Quantity:  30 tablet   Refills:  3       oxyCODONE-acetaminophen  MG per tablet   Commonly known as:  PERCOCET   Used for:  Chronic neck and back pain   Started by:  Charles Gardner MD        Dose:  1 tablet   Take 1 tablet by mouth every 6 hours as needed for severe pain   Quantity:  60 tablet   Refills:  0       * varenicline 0.5 MG X 11 & 1 MG X 42 tablet   Commonly known as:  CHANTIX STARTING MONTH PAK   Used for:  Cigarette nicotine dependence without complication   Started by:  Charles Gardner MD        Take 0.5 mg tab daily for 3 days, then 0.5 mg tab twice daily for 4 days, then 1 mg twice daily.   Quantity:  53 tablet   Refills:  0       * varenicline 1 MG tablet   Commonly known as:  CHANTIX   Used for:  Cigarette nicotine dependence without " complication   Started by:  Charles Gardner MD        Dose:  1 mg   Take 1 tablet (1 mg) by mouth 2 times daily   Quantity:  56 tablet   Refills:  2       * Notice:  This list has 2 medication(s) that are the same as other medications prescribed for you. Read the directions carefully, and ask your doctor or other care provider to review them with you.      These medicines have changed or have updated prescriptions.        Dose/Directions    EPINEPHrine 0.3 MG/0.3ML injection 2-pack   Commonly known as:  EPIPEN/ADRENACLICK/or ANY BX GENERIC EQUIV   This may have changed:  Another medication with the same name was removed. Continue taking this medication, and follow the directions you see here.   Changed by:  Charles Gardner MD        Dose:  0.3 mg   Inject 0.3 mg into the muscle as needed for anaphylaxis   Refills:  0         Stop taking these medicines if you haven't already. Please contact your care team if you have questions.     HYDROcodone-acetaminophen 5-325 MG per tablet   Commonly known as:  NORCO   Stopped by:  Charles Gardner MD           ibuprofen 200 MG tablet   Commonly known as:  ADVIL/MOTRIN   Stopped by:  Charles Gardner MD           ranitidine 150 MG tablet   Commonly known as:  ZANTAC   Stopped by:  Charles Gardner MD           VENTOLIN  (90 Base) MCG/ACT Inhaler   Generic drug:  albuterol   Stopped by:  Charles Gardner MD                Where to get your medicines      These medications were sent to [facility] [address] [place] [facility] [place] - [address]  [address] [address]     Phone:  [phone]     hydrochlorothiazide 25 MG tablet    varenicline 0.5 MG X 11 & 1 MG X 42 tablet    varenicline 1 MG tablet         Some of these will need a paper prescription and others can be bought over the counter.  Ask your nurse if you have questions.     Bring a paper prescription for each  of these medications     Blood Pressure Monitoring Kit    oxyCODONE-acetaminophen  MG per tablet               Information about OPIOIDS     PRESCRIPTION OPIOIDS: WHAT YOU NEED TO KNOW   We gave you an opioid (narcotic) pain medicine. It is important to manage your pain, but opioids are not always the best choice. You should first try all the other options your care team gave you. Take this medicine for as short a time (and as few doses) as possible.     These medicines have risks:    DO NOT drive when on new or higher doses of pain medicine. These medicines can affect your alertness and reaction times, and you could be arrested for driving under the influence (DUI). If you need to use opioids long-term, talk to your care team about driving.    DO NOT operate heave machinery    DO NOT do any other dangerous activities while taking these medicines.     DO NOT drink any alcohol while taking these medicines.      If the opioid prescribed includes acetaminophen, DO NOT take with any other medicines that contain acetaminophen. Read all labels carefully. Look for the word  acetaminophen  or  Tylenol.  Ask your pharmacist if you have questions or are unsure.    You can get addicted to pain medicines, especially if you have a history of addiction (chemical, alcohol or substance dependence). Talk to your care team about ways to reduce this risk.    Store your pills in a secure place, locked if possible. We will not replace any lost or stolen medicine. If you don t finish your medicine, please throw away (dispose) as directed by your pharmacist. The [place] Pollution Control Agency has more information about safe disposal: https://www.pca.[facility].[place].us/living-green/managing-unwanted-medications.     All opioids tend to cause constipation. Drink plenty of water and eat foods that have a lot of fiber, such as fruits, vegetables, prune juice, apple juice and high-fiber cereal. Take a laxative (Miralax, milk of magnesia,  Viki Mcallister) if you don t move your bowels at least every other day.          Primary Care Provider Office Phone # Fax #    Cam Henderson PA-C [phone]       [facility] [address]  [facility] [address]        Equal Access to Services     CIRILO JAIMES : Hadii aad ku hadasho Soomaali, waaxda luqadaha, qaybta kaalmada adeegyada, waxay idiin hayaan adeeg khludmilacoco laamilcar . So [facility] [phone].    ATENCIÓN: Si habla español, tiene a collier disposición servicios gratuitos de asistencia lingüística. Sandra al [phone].    We comply with applicable federal civil rights laws and [place] laws. We do not discriminate on the basis of race, color, national origin, age, disability, sex, sexual orientation, or gender identity.            Thank you!     Thank you for choosing [facility]  for your care. Our goal is always to provide you with excellent care. Hearing back from our patients is one way we can continue to improve our services. Please take a few minutes to complete the written survey that you may receive in the mail after your visit with us. Thank you!             Your Updated Medication List - Protect others around you: Learn how to safely use, store and throw away your medicines at www.disposemymeds.org.          This list is accurate as of 6/14/18  5:31 PM.  Always use your most recent med list.                   Brand Name Dispense Instructions for use Diagnosis    Blood Pressure Monitoring Kit     1 kit    Use as directed    Essential hypertension       EPINEPHrine 0.3 MG/0.3ML injection 2-pack    EPIPEN/ADRENACLICK/or ANY BX GENERIC EQUIV     Inject 0.3 mg into the muscle as needed for anaphylaxis        gabapentin 300 MG capsule    NEURONTIN     TAKE ONE CAPSULE BY MOUTH DAILY FOR 3 DAYS, THEN TWICE DAILY FOR 3 DAYS, THEN THREE TIMES DAILY        hydrochlorothiazide 25 MG tablet    HYDRODIURIL    30 tablet    Take 1 tablet (25 mg) by mouth daily     Essential hypertension       methocarbamol 750 MG tablet    ROBAXIN     TAKE 1 TABLET BY MOUTH AT NIGHT FOR SPASMS        oxyCODONE-acetaminophen  MG per tablet    PERCOCET    60 tablet    Take 1 tablet by mouth every 6 hours as needed for severe pain    Chronic neck and back pain       * varenicline 0.5 MG X 11 & 1 MG X 42 tablet    CHANTIX STARTING MONTH JUSTIN    53 tablet    Take 0.5 mg tab daily for 3 days, then 0.5 mg tab twice daily for 4 days, then 1 mg twice daily.    Cigarette nicotine dependence without complication       * varenicline 1 MG tablet    CHANTIX    56 tablet    Take 1 tablet (1 mg) by mouth 2 times daily    Cigarette nicotine dependence without complication       * Notice:  This list has 2 medication(s) that are the same as other medications prescribed for you. Read the directions carefully, and ask your doctor or other care provider to review them with you.

## 2018-06-15 LAB
ANION GAP SERPL CALCULATED.3IONS-SCNC: 12 MMOL/L (ref 3–14)
BUN SERPL-MCNC: 10 MG/DL (ref 7–30)
CALCIUM SERPL-MCNC: 9.5 MG/DL (ref 8.5–10.1)
CHLORIDE SERPL-SCNC: 108 MMOL/L (ref 94–109)
CHOLEST SERPL-MCNC: 191 MG/DL
CO2 SERPL-SCNC: 20 MMOL/L (ref 20–32)
CREAT SERPL-MCNC: 0.66 MG/DL (ref 0.66–1.25)
GFR SERPL CREATININE-BSD FRML MDRD: >90 ML/MIN/1.7M2
GLUCOSE SERPL-MCNC: 92 MG/DL (ref 70–99)
HCV AB SERPL QL IA: NONREACTIVE
HDLC SERPL-MCNC: 78 MG/DL
LDLC SERPL CALC-MCNC: 98 MG/DL
NONHDLC SERPL-MCNC: 113 MG/DL
POTASSIUM SERPL-SCNC: 3.9 MMOL/L (ref 3.4–5.3)
PSA SERPL-ACNC: 0.61 UG/L (ref 0–4)
SODIUM SERPL-SCNC: 140 MMOL/L (ref 133–144)
TRIGL SERPL-MCNC: 75 MG/DL

## 2018-07-03 ENCOUNTER — OFFICE VISIT (OUTPATIENT)
Dept: FAMILY MEDICINE | Facility: CLINIC | Age: 58
End: 2018-07-03
Payer: COMMERCIAL

## 2018-07-03 VITALS
BODY MASS INDEX: 27.72 KG/M2 | SYSTOLIC BLOOD PRESSURE: 140 MMHG | OXYGEN SATURATION: 97 % | TEMPERATURE: 97.7 F | HEIGHT: 71 IN | WEIGHT: 198 LBS | DIASTOLIC BLOOD PRESSURE: 90 MMHG | HEART RATE: 72 BPM

## 2018-07-03 DIAGNOSIS — Z01.818 PREOP GENERAL PHYSICAL EXAM: Primary | ICD-10-CM

## 2018-07-03 DIAGNOSIS — M54.2 CHRONIC NECK AND BACK PAIN: ICD-10-CM

## 2018-07-03 DIAGNOSIS — G89.29 CHRONIC NECK AND BACK PAIN: ICD-10-CM

## 2018-07-03 DIAGNOSIS — M54.9 CHRONIC NECK AND BACK PAIN: ICD-10-CM

## 2018-07-03 DIAGNOSIS — M47.12 OSTEOARTHRITIS OF CERVICAL SPINE WITH MYELOPATHY: ICD-10-CM

## 2018-07-03 LAB — ETHANOL SERPL-MCNC: <0.01 G/DL

## 2018-07-03 PROCEDURE — 36415 COLL VENOUS BLD VENIPUNCTURE: CPT | Performed by: INTERNAL MEDICINE

## 2018-07-03 PROCEDURE — 80320 DRUG SCREEN QUANTALCOHOLS: CPT | Performed by: INTERNAL MEDICINE

## 2018-07-03 PROCEDURE — 99214 OFFICE O/P EST MOD 30 MIN: CPT | Performed by: INTERNAL MEDICINE

## 2018-07-03 PROCEDURE — 80307 DRUG TEST PRSMV CHEM ANLYZR: CPT | Performed by: INTERNAL MEDICINE

## 2018-07-03 RX ORDER — OXYCODONE AND ACETAMINOPHEN 10; 325 MG/1; MG/1
1 TABLET ORAL EVERY 6 HOURS PRN
Qty: 60 TABLET | Refills: 0 | Status: SHIPPED | OUTPATIENT
Start: 2018-07-03 | End: 2020-02-04

## 2018-07-03 RX ORDER — METHOCARBAMOL 750 MG/1
750 TABLET, FILM COATED ORAL
Qty: 30 TABLET | Refills: 0 | Status: SHIPPED | OUTPATIENT
Start: 2018-07-03 | End: 2018-08-17

## 2018-07-03 NOTE — MR AVS SNAPSHOT
After Visit Summary   7/3/2018    Pierce Sullivan    MRN: [id number]           Patient Information     Date Of Birth          1960        Visit Information        Provider Department      7/3/2018 2:30 PM Charles Gadrner MD [facility]        Today's Diagnoses     Preop general physical exam    -  1    Chronic neck and back pain          Care Instructions      Before Your Surgery      Call your surgeon if there is any change in your health. This includes signs of a cold or flu (such as a sore throat, runny nose, cough, rash or fever).    Do not smoke, drink alcohol or take over the counter medicine (unless your surgeon or primary care doctor tells you to) for the 24 hours before and after surgery.    If you take prescribed drugs: Follow your doctor s orders about which medicines to take and which to stop until after surgery.    Eating and drinking prior to surgery: follow the instructions from your surgeon    Take a shower or bath the night before surgery. Use the soap your surgeon gave you to gently clean your skin. If you do not have soap from your surgeon, use your regular soap. Do not shave or scrub the surgery site.  Wear clean pajamas and have clean sheets on your bed.           Follow-ups after your visit        Who to contact     If you have questions or need follow up information about today's clinic visit or your schedule please contact [facility] directly at [phone].  Normal or non-critical lab and imaging results will be communicated to you by MyChart, letter or phone within 4 business days after the clinic has received the results. If you do not hear from us within 7 days, please contact the clinic through MyChart or phone. If you have a critical or abnormal lab result, we will notify you by phone as soon as possible.  Submit refill requests through [employer] or call your pharmacy and they will forward the refill request to us. Please allow 3  "business days for your refill to be completed.          Additional Information About Your Visit        Care EveryWhere ID     This is your Care EveryWhere ID. This could be used by other organizations to access your [place] medical records  [id number]        Your Vitals Were     Pulse Temperature Height Pulse Oximetry BMI (Body Mass Index)       72 97.7  F (36.5  C) (Oral) 5' 11\" (1.803 m) 97% 27.62 kg/m2        Blood Pressure from Last 3 Encounters:   07/03/18 140/90   06/14/18 (!) 153/98   04/19/18 (!) 153/97    Weight from Last 3 Encounters:   07/03/18 198 lb (89.8 kg)   06/14/18 201 lb (91.2 kg)   04/19/18 200 lb (90.7 kg)              We Performed the Following     Alcohol ethyl     Drug screen urine          Today's Medication Changes          These changes are accurate as of 7/3/18  2:58 PM.  If you have any questions, ask your nurse or doctor.               These medicines have changed or have updated prescriptions.        Dose/Directions    methocarbamol 750 MG tablet   Commonly known as:  ROBAXIN   This may have changed:  See the new instructions.   Used for:  Chronic neck and back pain   Changed by:  Charles Gardner MD        Dose:  750 mg   Take 1 tablet (750 mg) by mouth nightly as needed for muscle spasms   Quantity:  30 tablet   Refills:  0         Stop taking these medicines if you haven't already. Please contact your care team if you have questions.     gabapentin 300 MG capsule   Commonly known as:  NEURONTIN   Stopped by:  Charles Gardner MD                Where to get your medicines      These medications were sent to [facility] [address] [place], [place] - [address]  [address] [facility] [address]     Phone:  [phone]     methocarbamol 750 MG tablet         Some of these will need a paper prescription and others can be bought over the counter.  Ask your nurse if you have questions.     Bring a paper prescription for each of these medications     " oxyCODONE-acetaminophen  MG per tablet               Information about OPIOIDS     PRESCRIPTION OPIOIDS: WHAT YOU NEED TO KNOW   We gave you an opioid (narcotic) pain medicine. It is important to manage your pain, but opioids are not always the best choice. You should first try all the other options your care team gave you. Take this medicine for as short a time (and as few doses) as possible.     These medicines have risks:    DO NOT drive when on new or higher doses of pain medicine. These medicines can affect your alertness and reaction times, and you could be arrested for driving under the influence (DUI). If you need to use opioids long-term, talk to your care team about driving.    DO NOT operate heave machinery    DO NOT do any other dangerous activities while taking these medicines.     DO NOT drink any alcohol while taking these medicines.      If the opioid prescribed includes acetaminophen, DO NOT take with any other medicines that contain acetaminophen. Read all labels carefully. Look for the word  acetaminophen  or  Tylenol.  Ask your pharmacist if you have questions or are unsure.    You can get addicted to pain medicines, especially if you have a history of addiction (chemical, alcohol or substance dependence). Talk to your care team about ways to reduce this risk.    Store your pills in a secure place, locked if possible. We will not replace any lost or stolen medicine. If you don t finish your medicine, please throw away (dispose) as directed by your pharmacist. The [place] Pollution Control Agency has more information about safe disposal: https://www.pca.[facility].[place]/living-green/managing-unwanted-medications.     All opioids tend to cause constipation. Drink plenty of water and eat foods that have a lot of fiber, such as fruits, vegetables, prune juice, apple juice and high-fiber cereal. Take a laxative (Miralax, milk of magnesia, Colace, Senna) if you don t move your bowels at least every  other day.          Primary Care Provider Office Phone # Fax #    Cam Henderson PA-C [phone]       [address]  [facility] [address]        Equal Access to Services     CIRILO JAIMES : Hadii jaki ku hadclaudioo Soomaali, waaxda luqadaha, qaybta kaalmada adeegyada, paco sigifredoin hayaan shiloh jhoncoco morrison. So [facility] [phone].    ATENCIÓN: Si habla español, tiene a collier disposición servicios gratuitos de asistencia lingüística. Llame al [phone].    We comply with applicable federal civil rights laws and [place] laws. We do not discriminate on the basis of race, color, national origin, age, disability, sex, sexual orientation, or gender identity.            Thank you!     Thank you for choosing [facility]  for your care. Our goal is always to provide you with excellent care. Hearing back from our patients is one way we can continue to improve our services. Please take a few minutes to complete the written survey that you may receive in the mail after your visit with us. Thank you!             Your Updated Medication List - Protect others around you: Learn how to safely use, store and throw away your medicines at www.disposemymeds.org.          This list is accurate as of 7/3/18  2:58 PM.  Always use your most recent med list.                   Brand Name Dispense Instructions for use Diagnosis    Blood Pressure Monitoring Kit     1 kit    Use as directed    Essential hypertension       EPINEPHrine 0.3 MG/0.3ML injection 2-pack    EPIPEN/ADRENACLICK/or ANY BX GENERIC EQUIV     Inject 0.3 mg into the muscle as needed for anaphylaxis        hydrochlorothiazide 25 MG tablet    HYDRODIURIL    30 tablet    Take 1 tablet (25 mg) by mouth daily    Essential hypertension       methocarbamol 750 MG tablet    ROBAXIN    30 tablet    Take 1 tablet (750 mg) by mouth nightly as needed for muscle spasms    Chronic neck and back pain       oxyCODONE-acetaminophen   MG per tablet    PERCOCET    60 tablet    Take 1 tablet by mouth every 6 hours as needed for severe pain    Chronic neck and back pain       * varenicline 0.5 MG X 11 & 1 MG X 42 tablet    CHANTIX STARTING MONTH JUSTIN    53 tablet    Take 0.5 mg tab daily for 3 days, then 0.5 mg tab twice daily for 4 days, then 1 mg twice daily.    Cigarette nicotine dependence without complication       * varenicline 1 MG tablet    CHANTIX    56 tablet    Take 1 tablet (1 mg) by mouth 2 times daily    Cigarette nicotine dependence without complication       * Notice:  This list has 2 medication(s) that are the same as other medications prescribed for you. Read the directions carefully, and ask your doctor or other care provider to review them with you.

## 2018-07-03 NOTE — PROGRESS NOTES
[facility]  [address] [facility] [address]  [phone]  Dept: [phone]    PRE-OP EVALUATION:  Today's date: 7/3/2018    Pierce Sullivan (: 1960) presents for pre-operative evaluation assessment as requested by Dr. Vail.  He requires evaluation and anesthesia risk assessment prior to undergoing surgery/procedure for treatment of laminoforainotomy.    Proposed Surgery/ Procedure:   Date of Surgery/ Procedure: 18  Time of Surgery/ Procedure: 5 am  Hospital/Surgical Facility: [facility] spine  [phone]  Primary Physician: Cam Henderson  Type of Anesthesia Anticipated: General    Patient has a Health Care Directive or Living Will:  NO    1. NO - Do you have a history of heart attack, stroke, stent, bypass or surgery on an artery in the head, neck, heart or legs?  2. NO - Do you ever have any pain or discomfort in your chest?  3. NO - Do you have a history of  Heart Failure?  4. NO - Are you troubled by shortness of breath when: walking on the level, up a slight hill or at night?  5. NO - Do you currently have a cold, bronchitis or other respiratory infection?  6. NO - Do you have a cough, shortness of breath or wheezing?  7. NO - Do you sometimes get pains in the calves of your legs when you walk?  8. NO - Do you or anyone in your family have previous history of blood clots?  9. NO - Do you or does anyone in your family have a serious bleeding problem such as prolonged bleeding following surgeries or cuts?  10. NO - Have you ever had problems with anemia or been told to take iron pills?  11. NO - Have you had any abnormal blood loss such as black, tarry or bloody stools, or abnormal vaginal bleeding?  12. NO - Have you ever had a blood transfusion?  13. NO - Have you or any of your relatives ever had problems with anesthesia?  14. NO - Do you have sleep apnea, excessive snoring or daytime drowsiness?  15. NO - Do you have any prosthetic heart valves?  16. NO - Do  you have prosthetic joints?  17. NO - Is there any chance that you may be pregnant?      HPI:     HPI related to upcoming procedure:     Patient has had progressive and severe posterior neck and upper back pain for which he has seen a spinal specialist and surgery is contemplated.  Apparently, this has caused nerve impingement leading to muscle atrophy and loss of strength at the right upper extremity. He also experiences occasional radicular sharp pain down his arms.      MEDICAL HISTORY:     Patient Active Problem List    Diagnosis Date Noted     Cigarette nicotine dependence without complication 06/14/2018     Priority: Medium     Cervicalgia 03/16/2014     Priority: Medium      Past Medical History:   Diagnosis Date     Allergy      Arthritis     spine and neck     Asthma      Gastroesophageal reflux disease      Ulcer, gastric, acute      Urticaria     Gets hives unknown origin     Past Surgical History:   Procedure Laterality Date     COLONOSCOPY       HERNIA REPAIR       ORTHOPEDIC SURGERY      torn quadrecep repair     Quadracep Tear Right     Repair     Current Outpatient Prescriptions   Medication Sig Dispense Refill     Blood Pressure Monitoring KIT Use as directed 1 kit 0     EPINEPHrine (EPIPEN/ADRENACLICK/OR ANY BX GENERIC EQUIV) 0.3 MG/0.3ML injection 2-pack Inject 0.3 mg into the muscle as needed for anaphylaxis       hydrochlorothiazide (HYDRODIURIL) 25 MG tablet Take 1 tablet (25 mg) by mouth daily 30 tablet 3     methocarbamol (ROBAXIN) 750 MG tablet Take 1 tablet (750 mg) by mouth nightly as needed for muscle spasms 30 tablet 0     oxyCODONE-acetaminophen (PERCOCET)  MG per tablet Take 1 tablet by mouth every 6 hours as needed for severe pain 60 tablet 0     varenicline (CHANTIX STARTING MONTH PAK) 0.5 MG X 11 & 1 MG X 42 tablet Take 0.5 mg tab daily for 3 days, then 0.5 mg tab twice daily for 4 days, then 1 mg twice daily. 53 tablet 0     varenicline (CHANTIX) 1 MG tablet Take 1 tablet (1  "mg) by mouth 2 times daily (Patient not taking: Reported on 7/3/2018) 56 tablet 2     OTC products: None, except as noted above    Allergies   Allergen Reactions     Bee Venom      Penicillins      As a child, but has had since      Latex Allergy: NO    Social History   Substance Use Topics     Smoking status: Current Every Day Smoker     Packs/day: 0.50     Years: 30.00     Types: Cigarettes     Smokeless tobacco: Never Used     Alcohol use Yes      Comment: Had been sober for years, started drinking 1 wk ago     History   Drug Use No       REVIEW OF SYSTEMS:   C: NEGATIVE for fever, chills, change in weight  E/M: NEGATIVE for ear, mouth and throat problems  R: NEGATIVE for significant cough or SOB  CV: NEGATIVE for chest pain, palpitations or peripheral edema  GI: NEGATIVE for nausea, abdominal pain, heartburn, or change in bowel habits  : NEGATIVE for frequency, dysuria, or hematuria  N: NEGATIVE for weakness, dizziness or paresthesias  H: NEGATIVE for bleeding problems      EXAM:   /90  Pulse 72  Temp 97.7  F (36.5  C) (Oral)  Ht 5' 11\" (1.803 m)  Wt 198 lb (89.8 kg)  SpO2 97%  BMI 27.62 kg/m2  Physical Exam    GENERAL APPEARANCE: healthy, alert and no distress    NECK: no adenopathy, no asymmetry, masses, or scars and thyroid normal to palpation    RESP: lungs clear to auscultation - no rales, rhonchi or wheezes    CV: regular rates and rhythm, normal S1 S2, no S3 or S4 and no murmur, click or rub -    ABDOMEN:  soft, nontender, no HSM or masses and bowel sounds normal    NEURO: Normal strength and tone, sensory exam grossly normal, mentation intact and speech normal    PSYCH: mentation appears normal. and affect normal/bright    LYMPHATICS: No axillary, cervical, inguinal, or supraclavicular nodes      DIAGNOSTICS:     Labs Drawn and in Process:   Unresulted Labs Ordered in the Past 30 Days of this Admission     Date and Time Order Name Status Description    7/3/2018 1447 ALCOHOL ETHYL In process "     7/3/2018 1447 DRUG SCREEN URINE In process           Recent Labs   Lab Test  06/14/18   1741  02/27/17   1325   HGB   --   16.9   PLT   --   260   NA  140  142   POTASSIUM  3.9  4.0   CR  0.66  0.76        IMPRESSION:   Diagnosis/reason for consult: DJD Cervical Spine    The proposed surgical procedure is considered INTERMEDIATE risk.    REVISED CARDIAC RISK INDEX  The patient has the following serious cardiovascular risks for perioperative complications such as (MI, PE, VFib and 3  AV Block):  No serious cardiac risks  INTERPRETATION: 0 risks: Class I (very low risk - 0.4% complication rate)    The patient has the following additional risks for perioperative complications: Patient has had a history of alcohol abuse and was at the emergency room on 4/19/2018 due to alcoholic intoxication, but patient claims that he has been sober since then        ICD-10-CM    1. Preop general physical exam Z01.818 Drug screen urine     Alcohol ethyl   2. Osteoarthritis of cervical spine with myelopathy M47.12    3. Chronic neck and back pain M54.2 methocarbamol (ROBAXIN) 750 MG tablet    M54.9 oxyCODONE-acetaminophen (PERCOCET)  MG per tablet       RECOMMENDATIONS:     --Consult hospital rounder / IM to assist post-op medical management    APPROVAL GIVEN to proceed with proposed procedure, without further diagnostic evaluation       Signed Electronically by: Charles Gardner MD    Copy of this evaluation report is provided to requesting physician.    [facility] Preop Guidelines    Revised Cardiac Risk Index

## 2018-07-05 LAB
ACETAMINOPHEN QUAL: NEGATIVE
AMANTADINE: NEGATIVE
AMITRIPTYLINE QUAL: NEGATIVE
AMOXAPINE: NEGATIVE
AMPHETAMINES QUAL: NEGATIVE
ATROPINE: NEGATIVE
BENZODIAZ UR QL: NEGATIVE
CAFFEINE QUAL: POSITIVE
CANNABINOIDS UR QL SCN: NEGATIVE
CARBAMAZEPINE QUAL: NEGATIVE
CHLORPHENIRAMINE: NEGATIVE
CHLORPROMAZINE: NEGATIVE
CITALOPRAM QUAL: NEGATIVE
CLOMIPRAMINE QUAL: NEGATIVE
COCAINE QUAL: NEGATIVE
COCAINE UR QL: NEGATIVE
CODEINE QUAL: ABNORMAL
DESIPRAMINE QUAL: NEGATIVE
DEXTROMETHORPHAN: NEGATIVE
DIPHENHYDRAMINE: NEGATIVE
DOXEPIN/METABOLITE: NEGATIVE
DOXYLAMINE: NEGATIVE
EPHEDRINE OR PSEUDO: NEGATIVE
FENTANYL QUAL: NEGATIVE
FLUOXETINE AND METAB: NEGATIVE
HYDROCODONE QUAL: ABNORMAL
HYDROMORPHONE QUAL: ABNORMAL
IBUPROFEN QUAL: NEGATIVE
IMIPRAMINE QUAL: NEGATIVE
LAMOTRIGINE QUAL: NEGATIVE
LOXAPINE: NEGATIVE
MAPROTYLINE: NEGATIVE
MDMA QUAL: NEGATIVE
MEPERIDINE QUAL: NEGATIVE
METHAMPHETAMINE: NEGATIVE
METHODONE QUAL: NEGATIVE
MORPHINE QUAL: ABNORMAL
NICOTINE: POSITIVE
NORTRIPTYLINE QUAL: NEGATIVE
OLANZAPINE QUAL: NEGATIVE
OPIATES UR QL SCN: POSITIVE
OXYCODONE QUAL: ABNORMAL
PENTAZOCINE: NEGATIVE
PHENCYCLIDINE QUAL: NEGATIVE
PHENMETRAZINE: NEGATIVE
PHENTERMINE: NEGATIVE
PHENYLBUTAZONE: NEGATIVE
PHENYLPROPANOLAMINE: NEGATIVE
PROPOXPHENE QUAL: NEGATIVE
PROPRANOLOL QUAL: NEGATIVE
PYRILAMINE: NEGATIVE
SALICYLATE QUAL: NEGATIVE
THEOBROMINE: POSITIVE
TOPIRAMATE QUAL: NEGATIVE
TRIMIPRAMINE QUAL: NEGATIVE
VENLAFAXINE QUAL: NEGATIVE

## 2018-07-17 ENCOUNTER — TRANSFERRED RECORDS (OUTPATIENT)
Dept: HEALTH INFORMATION MANAGEMENT | Facility: CLINIC | Age: 58
End: 2018-07-17

## 2018-07-20 ENCOUNTER — TELEPHONE (OUTPATIENT)
Dept: FAMILY MEDICINE | Facility: CLINIC | Age: 58
End: 2018-07-20

## 2018-07-20 NOTE — TELEPHONE ENCOUNTER
Reason for Call:  Other request to fax pre op to [facility]    Detailed comments: clari with [facility] called to request pre op information be sent to them for patient.  Pre op done with Dr. Gardner 7/3.    Fax:  [phone]    Phone:  [phone]      Call taken on 7/20/2018 at 12:23 PM by Edelmira Swift  .

## 2018-08-17 DIAGNOSIS — M54.2 CHRONIC NECK AND BACK PAIN: ICD-10-CM

## 2018-08-17 DIAGNOSIS — M54.9 CHRONIC NECK AND BACK PAIN: ICD-10-CM

## 2018-08-17 DIAGNOSIS — G89.29 CHRONIC NECK AND BACK PAIN: ICD-10-CM

## 2018-08-20 RX ORDER — METHOCARBAMOL 750 MG/1
750 TABLET, FILM COATED ORAL
Qty: 30 TABLET | Refills: 0 | Status: SHIPPED | OUTPATIENT
Start: 2018-08-20 | End: 2020-02-04

## 2020-02-04 ENCOUNTER — APPOINTMENT (OUTPATIENT)
Dept: CT IMAGING | Facility: CLINIC | Age: 60
DRG: 897 | End: 2020-02-04
Attending: EMERGENCY MEDICINE
Payer: COMMERCIAL

## 2020-02-04 ENCOUNTER — HOSPITAL ENCOUNTER (INPATIENT)
Facility: CLINIC | Age: 60
LOS: 2 days | Discharge: HOME | DRG: 897 | End: 2020-02-06
Attending: EMERGENCY MEDICINE | Admitting: HOSPITALIST
Payer: COMMERCIAL

## 2020-02-04 DIAGNOSIS — F17.200 SMOKER: ICD-10-CM

## 2020-02-04 DIAGNOSIS — K70.10 ALCOHOLIC HEPATITIS WITHOUT ASCITES (H): Primary | ICD-10-CM

## 2020-02-04 DIAGNOSIS — E87.1 HYPONATREMIA: ICD-10-CM

## 2020-02-04 DIAGNOSIS — E87.6 HYPOKALEMIA: ICD-10-CM

## 2020-02-04 DIAGNOSIS — R56.9 SEIZURE (H): ICD-10-CM

## 2020-02-04 PROBLEM — F10.939 ALCOHOL WITHDRAWAL (H): Status: ACTIVE | Noted: 2020-02-04

## 2020-02-04 PROBLEM — F10.239 ALCOHOL DEPENDENCE WITH WITHDRAWAL WITH COMPLICATION (H): Status: ACTIVE | Noted: 2020-02-04

## 2020-02-04 LAB
ALBUMIN SERPL-MCNC: 4.1 G/DL (ref 3.4–5)
ALBUMIN UR-MCNC: NEGATIVE MG/DL
ALP SERPL-CCNC: 85 U/L (ref 40–150)
ALT SERPL W P-5'-P-CCNC: 52 U/L (ref 0–70)
ANION GAP SERPL CALCULATED.3IONS-SCNC: 11 MMOL/L (ref 3–14)
APPEARANCE UR: CLEAR
AST SERPL W P-5'-P-CCNC: 91 U/L (ref 0–45)
BACTERIA #/AREA URNS HPF: ABNORMAL /HPF
BASOPHILS # BLD AUTO: 0 10E9/L (ref 0–0.2)
BASOPHILS NFR BLD AUTO: 0.1 %
BILIRUB SERPL-MCNC: 2.1 MG/DL (ref 0.2–1.3)
BILIRUB UR QL STRIP: NEGATIVE
BUN SERPL-MCNC: 15 MG/DL (ref 7–30)
CALCIUM SERPL-MCNC: 11.1 MG/DL (ref 8.5–10.1)
CHLORIDE SERPL-SCNC: 65 MMOL/L (ref 94–109)
CO2 SERPL-SCNC: 43 MMOL/L (ref 20–32)
COLOR UR AUTO: YELLOW
CREAT SERPL-MCNC: 0.76 MG/DL (ref 0.66–1.25)
DIFFERENTIAL METHOD BLD: ABNORMAL
EOSINOPHIL # BLD AUTO: 0 10E9/L (ref 0–0.7)
EOSINOPHIL NFR BLD AUTO: 0 %
ERYTHROCYTE [DISTWIDTH] IN BLOOD BY AUTOMATED COUNT: 12 % (ref 10–15)
GFR SERPL CREATININE-BSD FRML MDRD: >90 ML/MIN/{1.73_M2}
GLUCOSE BLDC GLUCOMTR-MCNC: 135 MG/DL (ref 70–99)
GLUCOSE BLDC GLUCOMTR-MCNC: 162 MG/DL (ref 70–99)
GLUCOSE SERPL-MCNC: 155 MG/DL (ref 70–99)
GLUCOSE UR STRIP-MCNC: NEGATIVE MG/DL
HCT VFR BLD AUTO: 42.5 % (ref 40–53)
HGB BLD-MCNC: 16.2 G/DL (ref 13.3–17.7)
HGB UR QL STRIP: ABNORMAL
IMM GRANULOCYTES # BLD: 0.1 10E9/L (ref 0–0.4)
IMM GRANULOCYTES NFR BLD: 0.3 %
KETONES UR STRIP-MCNC: NEGATIVE MG/DL
LACTATE BLD-SCNC: 3.2 MMOL/L (ref 0.7–2)
LEUKOCYTE ESTERASE UR QL STRIP: NEGATIVE
LYMPHOCYTES # BLD AUTO: 1.1 10E9/L (ref 0.8–5.3)
LYMPHOCYTES NFR BLD AUTO: 6.8 %
MAGNESIUM SERPL-MCNC: 1.8 MG/DL (ref 1.6–2.3)
MCH RBC QN AUTO: 30.7 PG (ref 26.5–33)
MCHC RBC AUTO-ENTMCNC: 38.1 G/DL (ref 31.5–36.5)
MCV RBC AUTO: 81 FL (ref 78–100)
MONOCYTES # BLD AUTO: 0.7 10E9/L (ref 0–1.3)
MONOCYTES NFR BLD AUTO: 4.7 %
NEUTROPHILS # BLD AUTO: 13.7 10E9/L (ref 1.6–8.3)
NEUTROPHILS NFR BLD AUTO: 88.1 %
NITRATE UR QL: NEGATIVE
NRBC # BLD AUTO: 0 10*3/UL
NRBC BLD AUTO-RTO: 0 /100
PH UR STRIP: 7 PH (ref 5–7)
PHOSPHATE SERPL-MCNC: 3.8 MG/DL (ref 2.5–4.5)
PLATELET # BLD AUTO: 169 10E9/L (ref 150–450)
POTASSIUM SERPL-SCNC: 2.1 MMOL/L (ref 3.4–5.3)
POTASSIUM SERPL-SCNC: 2.4 MMOL/L (ref 3.4–5.3)
POTASSIUM SERPL-SCNC: 2.8 MMOL/L (ref 3.4–5.3)
PROT SERPL-MCNC: 7.5 G/DL (ref 6.8–8.8)
RBC # BLD AUTO: 5.27 10E12/L (ref 4.4–5.9)
RBC #/AREA URNS AUTO: 1 /HPF (ref 0–2)
SODIUM SERPL-SCNC: 119 MMOL/L (ref 133–144)
SODIUM SERPL-SCNC: 119 MMOL/L (ref 133–144)
SODIUM SERPL-SCNC: 122 MMOL/L (ref 133–144)
SODIUM SERPL-SCNC: 123 MMOL/L (ref 133–144)
SOURCE: ABNORMAL
SP GR UR STRIP: 1 (ref 1–1.03)
UROBILINOGEN UR STRIP-MCNC: NORMAL MG/DL (ref 0–2)
WBC # BLD AUTO: 15.5 10E9/L (ref 4–11)
WBC #/AREA URNS AUTO: 1 /HPF (ref 0–5)

## 2020-02-04 PROCEDURE — 36415 COLL VENOUS BLD VENIPUNCTURE: CPT | Performed by: EMERGENCY MEDICINE

## 2020-02-04 PROCEDURE — 25800030 ZZH RX IP 258 OP 636: Performed by: EMERGENCY MEDICINE

## 2020-02-04 PROCEDURE — 25000132 ZZH RX MED GY IP 250 OP 250 PS 637: Performed by: EMERGENCY MEDICINE

## 2020-02-04 PROCEDURE — 85025 COMPLETE CBC W/AUTO DIFF WBC: CPT | Performed by: EMERGENCY MEDICINE

## 2020-02-04 PROCEDURE — 99291 CRITICAL CARE FIRST HOUR: CPT | Performed by: INTERNAL MEDICINE

## 2020-02-04 PROCEDURE — 93005 ELECTROCARDIOGRAM TRACING: CPT

## 2020-02-04 PROCEDURE — 00000146 ZZHCL STATISTIC GLUCOSE BY METER IP

## 2020-02-04 PROCEDURE — 93005 ELECTROCARDIOGRAM TRACING: CPT | Mod: 76

## 2020-02-04 PROCEDURE — 99285 EMERGENCY DEPT VISIT HI MDM: CPT | Mod: 25

## 2020-02-04 PROCEDURE — 20000003 ZZH R&B ICU

## 2020-02-04 PROCEDURE — 96376 TX/PRO/DX INJ SAME DRUG ADON: CPT

## 2020-02-04 PROCEDURE — 96375 TX/PRO/DX INJ NEW DRUG ADDON: CPT

## 2020-02-04 PROCEDURE — 83735 ASSAY OF MAGNESIUM: CPT | Performed by: EMERGENCY MEDICINE

## 2020-02-04 PROCEDURE — 96361 HYDRATE IV INFUSION ADD-ON: CPT

## 2020-02-04 PROCEDURE — 84132 ASSAY OF SERUM POTASSIUM: CPT | Performed by: EMERGENCY MEDICINE

## 2020-02-04 PROCEDURE — 83605 ASSAY OF LACTIC ACID: CPT | Performed by: EMERGENCY MEDICINE

## 2020-02-04 PROCEDURE — 25000128 H RX IP 250 OP 636: Performed by: INTERNAL MEDICINE

## 2020-02-04 PROCEDURE — 25000132 ZZH RX MED GY IP 250 OP 250 PS 637: Performed by: HOSPITALIST

## 2020-02-04 PROCEDURE — 70450 CT HEAD/BRAIN W/O DYE: CPT

## 2020-02-04 PROCEDURE — 80053 COMPREHEN METABOLIC PANEL: CPT | Performed by: EMERGENCY MEDICINE

## 2020-02-04 PROCEDURE — 83690 ASSAY OF LIPASE: CPT | Performed by: EMERGENCY MEDICINE

## 2020-02-04 PROCEDURE — 81001 URINALYSIS AUTO W/SCOPE: CPT | Performed by: EMERGENCY MEDICINE

## 2020-02-04 PROCEDURE — 84100 ASSAY OF PHOSPHORUS: CPT | Performed by: EMERGENCY MEDICINE

## 2020-02-04 PROCEDURE — C9113 INJ PANTOPRAZOLE SODIUM, VIA: HCPCS | Performed by: HOSPITALIST

## 2020-02-04 PROCEDURE — 25000128 H RX IP 250 OP 636: Performed by: EMERGENCY MEDICINE

## 2020-02-04 PROCEDURE — 25000132 ZZH RX MED GY IP 250 OP 250 PS 637: Performed by: INTERNAL MEDICINE

## 2020-02-04 PROCEDURE — 96365 THER/PROPH/DIAG IV INF INIT: CPT

## 2020-02-04 PROCEDURE — 25000128 H RX IP 250 OP 636: Performed by: HOSPITALIST

## 2020-02-04 PROCEDURE — 99223 1ST HOSP IP/OBS HIGH 75: CPT | Mod: AI | Performed by: HOSPITALIST

## 2020-02-04 PROCEDURE — 25000125 ZZHC RX 250: Performed by: EMERGENCY MEDICINE

## 2020-02-04 PROCEDURE — HZ2ZZZZ DETOXIFICATION SERVICES FOR SUBSTANCE ABUSE TREATMENT: ICD-10-PCS | Performed by: HOSPITALIST

## 2020-02-04 PROCEDURE — 84295 ASSAY OF SERUM SODIUM: CPT | Performed by: EMERGENCY MEDICINE

## 2020-02-04 RX ORDER — FOLIC ACID 5 MG/ML
1 INJECTION, SOLUTION INTRAMUSCULAR; INTRAVENOUS; SUBCUTANEOUS DAILY
Status: DISCONTINUED | OUTPATIENT
Start: 2020-02-05 | End: 2020-02-05

## 2020-02-04 RX ORDER — DIAZEPAM 10 MG/2ML
5-10 INJECTION, SOLUTION INTRAMUSCULAR; INTRAVENOUS EVERY 30 MIN PRN
Status: DISCONTINUED | OUTPATIENT
Start: 2020-02-04 | End: 2020-02-06 | Stop reason: HOSPADM

## 2020-02-04 RX ORDER — DIAZEPAM 10 MG/2ML
10 INJECTION, SOLUTION INTRAMUSCULAR; INTRAVENOUS ONCE
Status: COMPLETED | OUTPATIENT
Start: 2020-02-04 | End: 2020-02-04

## 2020-02-04 RX ORDER — FLUMAZENIL 0.1 MG/ML
0.2 INJECTION, SOLUTION INTRAVENOUS
Status: DISCONTINUED | OUTPATIENT
Start: 2020-02-04 | End: 2020-02-06 | Stop reason: HOSPADM

## 2020-02-04 RX ORDER — POTASSIUM CHLORIDE 7.45 MG/ML
10 INJECTION INTRAVENOUS
Status: DISCONTINUED | OUTPATIENT
Start: 2020-02-04 | End: 2020-02-05

## 2020-02-04 RX ORDER — SODIUM CHLORIDE AND POTASSIUM CHLORIDE 150; 900 MG/100ML; MG/100ML
INJECTION, SOLUTION INTRAVENOUS CONTINUOUS
Status: DISCONTINUED | OUTPATIENT
Start: 2020-02-04 | End: 2020-02-06

## 2020-02-04 RX ORDER — POTASSIUM CHLORIDE 1.5 G/1.58G
20-40 POWDER, FOR SOLUTION ORAL
Status: DISCONTINUED | OUTPATIENT
Start: 2020-02-04 | End: 2020-02-05

## 2020-02-04 RX ORDER — FOLIC ACID 1 MG/1
1 TABLET ORAL DAILY
Status: DISCONTINUED | OUTPATIENT
Start: 2020-02-07 | End: 2020-02-06 | Stop reason: HOSPADM

## 2020-02-04 RX ORDER — POTASSIUM CHLORIDE 1.5 G/1.58G
40 POWDER, FOR SOLUTION ORAL ONCE
Status: COMPLETED | OUTPATIENT
Start: 2020-02-04 | End: 2020-02-04

## 2020-02-04 RX ORDER — DIAZEPAM 5 MG
10 TABLET ORAL EVERY 30 MIN PRN
Status: DISCONTINUED | OUTPATIENT
Start: 2020-02-04 | End: 2020-02-06 | Stop reason: HOSPADM

## 2020-02-04 RX ORDER — CLONIDINE HYDROCHLORIDE 0.1 MG/1
0.1 TABLET ORAL EVERY 8 HOURS
Status: DISCONTINUED | OUTPATIENT
Start: 2020-02-04 | End: 2020-02-06 | Stop reason: HOSPADM

## 2020-02-04 RX ORDER — LANOLIN ALCOHOL/MO/W.PET/CERES
100 CREAM (GRAM) TOPICAL 3 TIMES DAILY
Status: DISCONTINUED | OUTPATIENT
Start: 2020-02-07 | End: 2020-02-05

## 2020-02-04 RX ORDER — POTASSIUM CHLORIDE 1500 MG/1
20-40 TABLET, EXTENDED RELEASE ORAL
Status: DISCONTINUED | OUTPATIENT
Start: 2020-02-04 | End: 2020-02-05

## 2020-02-04 RX ORDER — POTASSIUM CHLORIDE 1500 MG/1
40 TABLET, EXTENDED RELEASE ORAL
Status: DISCONTINUED | OUTPATIENT
Start: 2020-02-04 | End: 2020-02-04

## 2020-02-04 RX ORDER — POLYETHYLENE GLYCOL 3350 17 G/17G
17 POWDER, FOR SOLUTION ORAL DAILY PRN
Status: DISCONTINUED | OUTPATIENT
Start: 2020-02-04 | End: 2020-02-06 | Stop reason: HOSPADM

## 2020-02-04 RX ORDER — MULTIPLE VITAMINS W/ MINERALS TAB 9MG-400MCG
1 TAB ORAL DAILY
Status: DISCONTINUED | OUTPATIENT
Start: 2020-02-05 | End: 2020-02-06 | Stop reason: HOSPADM

## 2020-02-04 RX ORDER — METOPROLOL TARTRATE 1 MG/ML
5 INJECTION, SOLUTION INTRAVENOUS EVERY 6 HOURS PRN
Status: DISCONTINUED | OUTPATIENT
Start: 2020-02-04 | End: 2020-02-05

## 2020-02-04 RX ORDER — POTASSIUM CHLORIDE 7.45 MG/ML
10 INJECTION INTRAVENOUS ONCE
Status: DISCONTINUED | OUTPATIENT
Start: 2020-02-04 | End: 2020-02-04

## 2020-02-04 RX ORDER — NALOXONE HYDROCHLORIDE 0.4 MG/ML
.1-.4 INJECTION, SOLUTION INTRAMUSCULAR; INTRAVENOUS; SUBCUTANEOUS
Status: DISCONTINUED | OUTPATIENT
Start: 2020-02-04 | End: 2020-02-06 | Stop reason: HOSPADM

## 2020-02-04 RX ORDER — LANOLIN ALCOHOL/MO/W.PET/CERES
100 CREAM (GRAM) TOPICAL DAILY
Status: DISCONTINUED | OUTPATIENT
Start: 2020-02-12 | End: 2020-02-06 | Stop reason: HOSPADM

## 2020-02-04 RX ORDER — POTASSIUM CL/LIDO/0.9 % NACL 10MEQ/0.1L
10 INTRAVENOUS SOLUTION, PIGGYBACK (ML) INTRAVENOUS
Status: DISCONTINUED | OUTPATIENT
Start: 2020-02-04 | End: 2020-02-05

## 2020-02-04 RX ORDER — POTASSIUM CHLORIDE 29.8 MG/ML
20 INJECTION INTRAVENOUS
Status: DISCONTINUED | OUTPATIENT
Start: 2020-02-04 | End: 2020-02-05

## 2020-02-04 RX ORDER — POTASSIUM CL/LIDO/0.9 % NACL 10MEQ/0.1L
10 INTRAVENOUS SOLUTION, PIGGYBACK (ML) INTRAVENOUS
Status: DISCONTINUED | OUTPATIENT
Start: 2020-02-04 | End: 2020-02-04

## 2020-02-04 RX ORDER — HALOPERIDOL 5 MG/ML
2.5-5 INJECTION INTRAMUSCULAR EVERY 4 HOURS PRN
Status: DISCONTINUED | OUTPATIENT
Start: 2020-02-04 | End: 2020-02-06 | Stop reason: HOSPADM

## 2020-02-04 RX ORDER — MAGNESIUM SULFATE HEPTAHYDRATE 40 MG/ML
4 INJECTION, SOLUTION INTRAVENOUS EVERY 4 HOURS PRN
Status: DISCONTINUED | OUTPATIENT
Start: 2020-02-04 | End: 2020-02-05

## 2020-02-04 RX ORDER — FOLIC ACID 5 MG/ML
1 INJECTION, SOLUTION INTRAMUSCULAR; INTRAVENOUS; SUBCUTANEOUS ONCE
Status: DISCONTINUED | OUTPATIENT
Start: 2020-02-04 | End: 2020-02-04

## 2020-02-04 RX ORDER — SODIUM CHLORIDE 3 G/100ML
100 INJECTION, SOLUTION INTRAVENOUS ONCE
Status: DISCONTINUED | OUTPATIENT
Start: 2020-02-04 | End: 2020-02-04

## 2020-02-04 RX ORDER — BETAMETHASONE DIPROPIONATE 0.05 %
OINTMENT (GRAM) TOPICAL 2 TIMES DAILY PRN
COMMUNITY
End: 2020-02-04

## 2020-02-04 RX ORDER — DIAZEPAM 10 MG/2ML
10 INJECTION, SOLUTION INTRAMUSCULAR; INTRAVENOUS
Status: COMPLETED | OUTPATIENT
Start: 2020-02-04 | End: 2020-02-04

## 2020-02-04 RX ADMIN — SODIUM CHLORIDE: 234 INJECTION INTRAMUSCULAR; INTRAVENOUS; SUBCUTANEOUS at 20:11

## 2020-02-04 RX ADMIN — POTASSIUM CHLORIDE 40 MEQ: 1500 TABLET, EXTENDED RELEASE ORAL at 21:49

## 2020-02-04 RX ADMIN — CLONIDINE HYDROCHLORIDE 0.1 MG: 0.1 TABLET ORAL at 23:18

## 2020-02-04 RX ADMIN — FOLIC ACID: 5 INJECTION, SOLUTION INTRAMUSCULAR; INTRAVENOUS; SUBCUTANEOUS at 18:18

## 2020-02-04 RX ADMIN — POTASSIUM CHLORIDE 40 MEQ: 1.5 POWDER, FOR SOLUTION ORAL at 19:25

## 2020-02-04 RX ADMIN — POTASSIUM CHLORIDE AND SODIUM CHLORIDE: 900; 150 INJECTION, SOLUTION INTRAVENOUS at 22:43

## 2020-02-04 RX ADMIN — SODIUM CHLORIDE 1000 ML: 9 INJECTION, SOLUTION INTRAVENOUS at 18:35

## 2020-02-04 RX ADMIN — Medication 10 MEQ: at 20:20

## 2020-02-04 RX ADMIN — DIAZEPAM 10 MG: 5 INJECTION, SOLUTION INTRAMUSCULAR; INTRAVENOUS at 19:33

## 2020-02-04 RX ADMIN — PANTOPRAZOLE SODIUM 40 MG: 40 INJECTION, POWDER, FOR SOLUTION INTRAVENOUS at 23:18

## 2020-02-04 RX ADMIN — DIAZEPAM 10 MG: 5 INJECTION, SOLUTION INTRAMUSCULAR; INTRAVENOUS at 18:02

## 2020-02-04 ASSESSMENT — ENCOUNTER SYMPTOMS
FEVER: 0
TREMORS: 1
COUGH: 0
RHINORRHEA: 0
NAUSEA: 1
APPETITE CHANGE: 1
ABDOMINAL PAIN: 0

## 2020-02-04 NOTE — ED PROVIDER NOTES
History     Chief Complaint:  Withdrawal    HPI   Pierce Sullivan is a 59 year old male with a history of alcohol dependence who presents for concerns of alcohol withdrawal. Over the past four days, the patient has been trying to wean himself off of alcohol. He states that he has been nauseated the whole time he has done this and has been unable to tolerate anything orally. At this time, his last drink was sometimes this morning. He also reports feeling shaky, but denies any seizure like activity. He does report a history of alcohol withdrawal, and stayed at [facility] in September 2019, but denies any history of withdrawal seizures. Wife reports the patient had been sober for a decade before this last year when he started struggling again. Physically, the patient only reports nausea; he denies any abdominal pain, chest pain, cough, fevers, or cold symptoms.     Allergies:  Bee Venom  Penicillins    Medications:    EpiPen  Hydrochlorothiazide    Past Medical History:    Asthma  GERD  Gastric ulcer   Cigarette dependence   Alcohol dependence     Past Surgical History:    Colonoscopy  Hernia repair  Torn quadriceps repair, right     Family History:    Cardiac dysrhythmia  Cancer    Social History:  Marital Status:   [2]  Presents with wife.   Positive for tobacco use. Comment: 0.5 PPD.   Positive for alcohol use.   Negative for drug use.      Review of Systems   Constitutional: Positive for appetite change. Negative for fever.   HENT: Negative for congestion and rhinorrhea.    Respiratory: Negative for cough.    Cardiovascular: Negative for chest pain.   Gastrointestinal: Positive for nausea. Negative for abdominal pain.   Neurological: Positive for tremors.   All other systems reviewed and are negative.      Physical Exam     Patient Vitals for the past 24 hrs:   BP Temp Temp src Pulse Heart Rate Resp SpO2 Weight   02/04/20 1920 -- -- -- -- -- -- -- 90.7 kg (200 lb)   02/04/20 1915 (!) 164/98 -- -- -- 80  17 100 % --   02/04/20 1900 (!) 164/99 -- -- 81 81 24 100 % --   02/04/20 1850 (!) 159/102 -- -- 84 84 22 -- --   02/04/20 1845 (!) 152/83 -- -- 84 80 26 97 % --   02/04/20 1840 128/68 -- -- 90 87 20 -- --   02/04/20 1835 (!) 70/45 -- -- 98 -- -- -- --   02/04/20 1830 (!) 60/40 -- -- 102 117 25 91 % --   02/04/20 1826 (!) 67/54 -- -- 93 101 (!) 36 -- --   02/04/20 1815 (!) 152/86 -- -- 75 74 18 96 % --   02/04/20 1800 (!) 153/97 -- -- 86 90 23 98 % --   02/04/20 1745 (!) 166/101 -- -- 86 82 16 98 % --   02/04/20 1743 (!) 177/95 -- -- 84 84 29 -- --   02/04/20 1741 -- -- -- -- -- -- 98 % --   02/04/20 1739 (!) 197/187 -- -- 85 -- -- -- --   02/04/20 1652 (!) 151/114 99.7  F (37.6  C) Temporal 144 -- 20 100 % --   02/04/20 1643 (!) 152/96 98.8  F (37.1  C) Temporal 105 -- 20 96 % --      Physical Exam  Constitutional: Alert, attentive, GCS 15. Tremulous.   HENT:    Nose: Nose normal.    Mouth/Throat: Oropharynx is clear, mucous membranes are dry.   Eyes: EOM are normal, anicteric, conjugate gaze  CV: tachycardic rate and regular rhythm; no murmurs  Chest: Effort normal and breath sounds clear without wheezing or rales, symmetric bilaterally   GI:  non tender. No distension. No guarding or rebound.    MSK: No LE edema, no tenderness to palpation of BLE.  Neurological: Alert, attentive, moving all extremities equally.   Skin: Skin is warm and dry.    Emergency Department Course   ECG:  Indication: Tachycardia  Time: 1811  Vent. Rate 82 bpm. IA interval 168. QRS duration 102. QT/QTc 538/628. P-R-T axis 68 66 55.    Normal sinus rhythm.  Prolonged QT  Abnormal ECG. Prolonged QT is new when compared to EKG dated 2/17/17. Read time: 1811.    Indication: Repeat  Time: 2006  Vent. Rate 91 bpm. IA interval 170. QRS duration 94. QT/QTc 450/553. P-R-T axis 55 86 48.    Normal sinus rhythm.  Prolonged QT.  Abnormal ECG. Improved QT compared to EKG dated 2/4/2020 (above). Read time: 2010.    Imaging:   Radiographic findings were  "communicated with the patient, family and Admitting MD who voiced understanding of the findings.  CT Head without contrast:   Normal CT scan of the head, as per radiology.    Laboratory:  CBC: WBC: 15.5 (H), HGB: 16.2, PLT: 169  CMP: Glucose 155 (H), Na: 119 (LL), K: 2.1 (LL), Cl: 65 (L), CO2: 43 (H), Ca: 11.1 (H), Bilirubin: 2.1 (H), AST: 91 (H), o/w WNL (Creatinine: 0.76)  Magnesium: 1.8  Lactic acid (Resulted 1820): 3.2 (H)  Glucose by meter (Collected: 1843): 162 (H)  Sodium (collected: 1952): 119 (LL)  Potassium (collected: 1952): 2.4 (LL)    UA with Microscopic: Blood: Trace (A), Bacteria: Few (A), o/w WNL     Procedures:  None     Interventions:  1802 Valium, 10 mg, IV injection  1818 Sodium chloride 0.9% with infuvite (Banana Bag), 1000 mL, IV   1835 NS 1L IV  1925 Klor-Con, 40 mEq, PO  1933 Valium, 10 mg, IV injection  2011 2% sodium chloride infusion, IV  2020 Potassium chloride, 10 mEq, IV infusion    Emergency Department Course:  Nursing notes and vitals reviewed.   1738: I performed an exam of the patient as documented above.      EKG obtained in the ED, see results above.    IV was inserted and blood was drawn for laboratory testing, results above. Medicine administered as documented above.     1830: I was called to the patient's bedside as he suddenly became incontinent of urine and stiffened up for ~40 seconds per nursing staff. Leading up to this, his wife reported he was acting \"weird\" to the nursing staff. As I was at bedside, the patient slowly started responding.     Patient moved to stabilization room 3 for increased monitoring.     1921: I rechecked the patient. I also discussed the potential transfer options as there are no ICU beds at this hospital.     1933: I consulted with Dr. Reyes of the intensivist services at [facility]. He is in agreement to accept the patient for admission.    1936: Patient placement at [facility] reports they will not have a bed available for " the patient until 2300 tonight. As his condition is critical, we will try other locations before waiting that long.     1949: I consulted with Dr. Khan of the hospitalist service at [facility]. He is in agreement to accept the patient for admission. It is unknown how long of a wait for a bed will be.     1957: Per charge nurse, there will be an ICU bed available for the patient here at [facility] in one hour.     The patient was sent for a head CT while in the emergency department, results above.    The patient provided a urine sample here in the emergency department. This was sent for laboratory testing, findings above.     2100: I consulted with Dr. Vasquez of the hospitalist services. He is in agreement to accept the patient for admission. He would like me to consult the intensivist prior to final disposition.     2109: I consulted with Dr. Fox of the intensivist service regarding the patient's history and presentation here in the emergency department.      Findings and plan explained to the Patient who consents to admission. Discussed the patient with Dr. Vasquez, who will admit the patient to an ICU bed for further monitoring, evaluation, and treatment.    Impression & Plan    CMS Diagnoses: The Lactic acid level is elevated due to alcohol abuse, at this time there is no sign of severe sepsis or septic shock.    Medical Decision Making:  Pierce Sullivan is a 59 year old male past medical history significant for alcohol dependence presenting for evaluation of nausea, vomiting malaise and tremulousness 4 days after his last drink following self tapering off alcohol.  He was hypertensive tachycardic tremulous with tongue fasciculations on arrival consistent with alcohol withdrawal.  IV access was obtained, banana bag with potassium and magnesium was empirically given as initial EKG showed significantly prolonged QT at 640 ms.   he was given 1 dose of IV Valium and had mild respiratory suppression and  hypoxia that readily resolved with nasal cannula however then went on to have brief confusional state with generalized tonic-clonic seizure activity and incontinence consistent with seizure with postictal state.  CT imaging of his head is negative.  His labs then came back with severe hyponatremia 119 and severe hypokalemia at 2.1.  He was initiated on 50 mils per hour 2% saline after 2 L normal saline bolus and was given 40 mEq p.o. in addition to 30 mEq total of IV potassium.  Recheck potassium was 2.4.  Repeat sodium 119 prompting increase rate to 50 on his hypertonic.  He did require another dose of IV Valium with normalization of his vital signs.  Precedex was deferred given fear of eliciting torsades with his QT prolongation showed bradycardia result.  He did have very short runs of V. tach just after his seizure that resolved without intervention.  And his electrolytes were aggressively replaced as above.  Patient was admitted to the intensive care unit for close monitoring of likely alcohol withdrawal seizure, severe hyponatremia, severe hypokalemia.  I do not suspect infectious etiology, his lactate was elevated this is likely type B lactic elevation secondary to alcohol dependence.  He denied any antecedent infectious symptoms. abx deferred.     Critical Care time:  was 45 minutes for this patient excluding procedures.    Diagnosis:    ICD-10-CM    1. Hyponatremia E87.1    2. Hypokalemia E87.6    3. Seizure (H) R56.9        Disposition:  Admitted to the ICU under the care of Dr. Pedro Guzmán MD  [employer]  10:55 PM 02/04/20     Scribe Disclosure:  I, Day Betancur, am serving as a scribe on 2/4/2020 at 5:41 PM to personally document services performed by Ronn Guzmán MD based on my observations and the provider's statements to me.      2/4/2020    EMERGENCY DEPARTMENT       Ronn Guzmán MD  02/04/20 [id number]

## 2020-02-04 NOTE — ED TRIAGE NOTES
Pt has been weaning self off of alcohol. Pt has not been able to keep anything down for the past 4 days. Pt states started drinking a week ago after being sober. Pt was at [facility] in September.

## 2020-02-05 PROBLEM — F17.200 SMOKER: Status: ACTIVE | Noted: 2020-02-05

## 2020-02-05 PROBLEM — R11.2 NAUSEA WITH VOMITING: Status: ACTIVE | Noted: 2020-02-05

## 2020-02-05 PROBLEM — F10.939 ALCOHOL WITHDRAWAL (H): Status: RESOLVED | Noted: 2020-02-04 | Resolved: 2020-02-05

## 2020-02-05 LAB
ANION GAP SERPL CALCULATED.3IONS-SCNC: 3 MMOL/L (ref 3–14)
ANION GAP SERPL CALCULATED.3IONS-SCNC: 7 MMOL/L (ref 3–14)
BUN SERPL-MCNC: 17 MG/DL (ref 7–30)
BUN SERPL-MCNC: 19 MG/DL (ref 7–30)
CALCIUM SERPL-MCNC: 8.6 MG/DL (ref 8.5–10.1)
CALCIUM SERPL-MCNC: 8.7 MG/DL (ref 8.5–10.1)
CHLORIDE SERPL-SCNC: 87 MMOL/L (ref 94–109)
CHLORIDE SERPL-SCNC: 94 MMOL/L (ref 94–109)
CO2 SERPL-SCNC: 34 MMOL/L (ref 20–32)
CO2 SERPL-SCNC: 35 MMOL/L (ref 20–32)
CREAT SERPL-MCNC: 0.82 MG/DL (ref 0.66–1.25)
CREAT SERPL-MCNC: 0.82 MG/DL (ref 0.66–1.25)
ERYTHROCYTE [DISTWIDTH] IN BLOOD BY AUTOMATED COUNT: 12.1 % (ref 10–15)
ETHANOL SERPL-MCNC: <0.01 G/DL
GFR SERPL CREATININE-BSD FRML MDRD: >90 ML/MIN/{1.73_M2}
GFR SERPL CREATININE-BSD FRML MDRD: >90 ML/MIN/{1.73_M2}
GLUCOSE BLDC GLUCOMTR-MCNC: 96 MG/DL (ref 70–99)
GLUCOSE SERPL-MCNC: 107 MG/DL (ref 70–99)
GLUCOSE SERPL-MCNC: 116 MG/DL (ref 70–99)
HCT VFR BLD AUTO: 35.8 % (ref 40–53)
HGB BLD-MCNC: 13.3 G/DL (ref 13.3–17.7)
INTERPRETATION ECG - MUSE: NORMAL
INTERPRETATION ECG - MUSE: NORMAL
LIPASE SERPL-CCNC: 105 U/L (ref 73–393)
MAGNESIUM SERPL-MCNC: 2.3 MG/DL (ref 1.6–2.3)
MCH RBC QN AUTO: 30.7 PG (ref 26.5–33)
MCHC RBC AUTO-ENTMCNC: 37.2 G/DL (ref 31.5–36.5)
MCV RBC AUTO: 83 FL (ref 78–100)
PLATELET # BLD AUTO: 140 10E9/L (ref 150–450)
POTASSIUM SERPL-SCNC: 2.6 MMOL/L (ref 3.4–5.3)
POTASSIUM SERPL-SCNC: 3.1 MMOL/L (ref 3.4–5.3)
RBC # BLD AUTO: 4.33 10E12/L (ref 4.4–5.9)
SODIUM SERPL-SCNC: 126 MMOL/L (ref 133–144)
SODIUM SERPL-SCNC: 128 MMOL/L (ref 133–144)
SODIUM SERPL-SCNC: 132 MMOL/L (ref 133–144)
SODIUM SERPL-SCNC: NORMAL MMOL/L (ref 133–144)
WBC # BLD AUTO: 13.6 10E9/L (ref 4–11)

## 2020-02-05 PROCEDURE — 99233 SBSQ HOSP IP/OBS HIGH 50: CPT | Performed by: INTERNAL MEDICINE

## 2020-02-05 PROCEDURE — 25000132 ZZH RX MED GY IP 250 OP 250 PS 637: Performed by: INTERNAL MEDICINE

## 2020-02-05 PROCEDURE — 84295 ASSAY OF SERUM SODIUM: CPT | Performed by: HOSPITALIST

## 2020-02-05 PROCEDURE — 99222 1ST HOSP IP/OBS MODERATE 55: CPT | Performed by: PSYCHIATRY & NEUROLOGY

## 2020-02-05 PROCEDURE — 25800030 ZZH RX IP 258 OP 636: Performed by: HOSPITALIST

## 2020-02-05 PROCEDURE — 25000128 H RX IP 250 OP 636: Performed by: INTERNAL MEDICINE

## 2020-02-05 PROCEDURE — 25000125 ZZHC RX 250: Performed by: INTERNAL MEDICINE

## 2020-02-05 PROCEDURE — 80320 DRUG SCREEN QUANTALCOHOLS: CPT | Performed by: INTERNAL MEDICINE

## 2020-02-05 PROCEDURE — 36415 COLL VENOUS BLD VENIPUNCTURE: CPT | Performed by: INTERNAL MEDICINE

## 2020-02-05 PROCEDURE — 36415 COLL VENOUS BLD VENIPUNCTURE: CPT | Performed by: HOSPITALIST

## 2020-02-05 PROCEDURE — 83735 ASSAY OF MAGNESIUM: CPT | Performed by: INTERNAL MEDICINE

## 2020-02-05 PROCEDURE — 00000146 ZZHCL STATISTIC GLUCOSE BY METER IP

## 2020-02-05 PROCEDURE — 12000000 ZZH R&B MED SURG/OB

## 2020-02-05 PROCEDURE — 80048 BASIC METABOLIC PNL TOTAL CA: CPT | Performed by: INTERNAL MEDICINE

## 2020-02-05 PROCEDURE — 25000125 ZZHC RX 250: Performed by: HOSPITALIST

## 2020-02-05 PROCEDURE — 85027 COMPLETE CBC AUTOMATED: CPT | Performed by: INTERNAL MEDICINE

## 2020-02-05 PROCEDURE — 25000132 ZZH RX MED GY IP 250 OP 250 PS 637: Performed by: HOSPITALIST

## 2020-02-05 PROCEDURE — 25000128 H RX IP 250 OP 636: Performed by: HOSPITALIST

## 2020-02-05 RX ORDER — POTASSIUM CHLORIDE 7.45 MG/ML
10 INJECTION INTRAVENOUS
Status: DISCONTINUED | OUTPATIENT
Start: 2020-02-05 | End: 2020-02-06

## 2020-02-05 RX ORDER — POTASSIUM CHLORIDE 29.8 MG/ML
20 INJECTION INTRAVENOUS
Status: DISCONTINUED | OUTPATIENT
Start: 2020-02-05 | End: 2020-02-05

## 2020-02-05 RX ORDER — POTASSIUM CHLORIDE 1500 MG/1
40 TABLET, EXTENDED RELEASE ORAL EVERY 4 HOURS
Status: COMPLETED | OUTPATIENT
Start: 2020-02-05 | End: 2020-02-05

## 2020-02-05 RX ORDER — METOCLOPRAMIDE 5 MG/1
10 TABLET ORAL EVERY 6 HOURS PRN
Status: DISCONTINUED | OUTPATIENT
Start: 2020-02-05 | End: 2020-02-06 | Stop reason: HOSPADM

## 2020-02-05 RX ORDER — METOCLOPRAMIDE HYDROCHLORIDE 5 MG/ML
10 INJECTION INTRAMUSCULAR; INTRAVENOUS EVERY 6 HOURS PRN
Status: DISCONTINUED | OUTPATIENT
Start: 2020-02-05 | End: 2020-02-06 | Stop reason: HOSPADM

## 2020-02-05 RX ORDER — POTASSIUM CHLORIDE 1.5 G/1.58G
20-40 POWDER, FOR SOLUTION ORAL
Status: DISCONTINUED | OUTPATIENT
Start: 2020-02-05 | End: 2020-02-06

## 2020-02-05 RX ORDER — ACETAMINOPHEN 325 MG/1
650 TABLET ORAL EVERY 4 HOURS PRN
Status: DISCONTINUED | OUTPATIENT
Start: 2020-02-05 | End: 2020-02-06 | Stop reason: HOSPADM

## 2020-02-05 RX ORDER — PROCHLORPERAZINE MALEATE 5 MG
10 TABLET ORAL EVERY 6 HOURS PRN
Status: DISCONTINUED | OUTPATIENT
Start: 2020-02-05 | End: 2020-02-06 | Stop reason: HOSPADM

## 2020-02-05 RX ORDER — POTASSIUM CHLORIDE 1500 MG/1
40 TABLET, EXTENDED RELEASE ORAL
Status: COMPLETED | OUTPATIENT
Start: 2020-02-05 | End: 2020-02-05

## 2020-02-05 RX ORDER — PROCHLORPERAZINE 25 MG
25 SUPPOSITORY, RECTAL RECTAL EVERY 12 HOURS PRN
Status: DISCONTINUED | OUTPATIENT
Start: 2020-02-05 | End: 2020-02-06 | Stop reason: HOSPADM

## 2020-02-05 RX ORDER — POTASSIUM CL/LIDO/0.9 % NACL 10MEQ/0.1L
10 INTRAVENOUS SOLUTION, PIGGYBACK (ML) INTRAVENOUS
Status: DISCONTINUED | OUTPATIENT
Start: 2020-02-05 | End: 2020-02-06

## 2020-02-05 RX ORDER — POTASSIUM CHLORIDE 1500 MG/1
40 TABLET, EXTENDED RELEASE ORAL ONCE
Status: DISCONTINUED | OUTPATIENT
Start: 2020-02-05 | End: 2020-02-05

## 2020-02-05 RX ORDER — PANTOPRAZOLE SODIUM 40 MG/1
40 TABLET, DELAYED RELEASE ORAL
Status: DISCONTINUED | OUTPATIENT
Start: 2020-02-05 | End: 2020-02-06

## 2020-02-05 RX ORDER — SUCRALFATE ORAL 1 G/10ML
1 SUSPENSION ORAL
Status: DISCONTINUED | OUTPATIENT
Start: 2020-02-05 | End: 2020-02-06

## 2020-02-05 RX ORDER — ONDANSETRON 2 MG/ML
4 INJECTION INTRAMUSCULAR; INTRAVENOUS EVERY 6 HOURS PRN
Status: DISCONTINUED | OUTPATIENT
Start: 2020-02-05 | End: 2020-02-06 | Stop reason: HOSPADM

## 2020-02-05 RX ORDER — ONDANSETRON 4 MG/1
4 TABLET, ORALLY DISINTEGRATING ORAL EVERY 6 HOURS PRN
Status: DISCONTINUED | OUTPATIENT
Start: 2020-02-05 | End: 2020-02-06 | Stop reason: HOSPADM

## 2020-02-05 RX ORDER — POTASSIUM CHLORIDE 1500 MG/1
20-40 TABLET, EXTENDED RELEASE ORAL
Status: DISCONTINUED | OUTPATIENT
Start: 2020-02-05 | End: 2020-02-06

## 2020-02-05 RX ADMIN — CLONIDINE HYDROCHLORIDE 0.1 MG: 0.1 TABLET ORAL at 21:52

## 2020-02-05 RX ADMIN — FOLIC ACID 1 MG: 5 INJECTION, SOLUTION INTRAMUSCULAR; INTRAVENOUS; SUBCUTANEOUS at 08:09

## 2020-02-05 RX ADMIN — POTASSIUM CHLORIDE 40 MEQ: 1500 TABLET, EXTENDED RELEASE ORAL at 08:11

## 2020-02-05 RX ADMIN — CLONIDINE HYDROCHLORIDE 0.1 MG: 0.1 TABLET ORAL at 14:28

## 2020-02-05 RX ADMIN — POTASSIUM CHLORIDE AND SODIUM CHLORIDE: 900; 150 INJECTION, SOLUTION INTRAVENOUS at 16:41

## 2020-02-05 RX ADMIN — POTASSIUM CHLORIDE 40 MEQ: 1500 TABLET, EXTENDED RELEASE ORAL at 10:18

## 2020-02-05 RX ADMIN — TOPICAL ANESTHETIC 0.5 ML: 200 SPRAY DENTAL; PERIODONTAL at 07:00

## 2020-02-05 RX ADMIN — TOPICAL ANESTHETIC 1 ML: 200 SPRAY DENTAL; PERIODONTAL at 10:17

## 2020-02-05 RX ADMIN — PANTOPRAZOLE SODIUM 40 MG: 40 TABLET, DELAYED RELEASE ORAL at 08:11

## 2020-02-05 RX ADMIN — ACETAMINOPHEN 650 MG: 325 TABLET, FILM COATED ORAL at 21:59

## 2020-02-05 RX ADMIN — THIAMINE HYDROCHLORIDE 200 MG: 100 INJECTION, SOLUTION INTRAMUSCULAR; INTRAVENOUS at 08:03

## 2020-02-05 RX ADMIN — POTASSIUM CHLORIDE AND SODIUM CHLORIDE: 900; 150 INJECTION, SOLUTION INTRAVENOUS at 09:59

## 2020-02-05 RX ADMIN — SUCRALFATE 1 G: 1 SUSPENSION ORAL at 21:52

## 2020-02-05 RX ADMIN — POTASSIUM CHLORIDE 40 MEQ: 1500 TABLET, EXTENDED RELEASE ORAL at 14:27

## 2020-02-05 RX ADMIN — ACETAMINOPHEN 650 MG: 325 TABLET, FILM COATED ORAL at 18:14

## 2020-02-05 RX ADMIN — CLONIDINE HYDROCHLORIDE 0.1 MG: 0.1 TABLET ORAL at 07:00

## 2020-02-05 RX ADMIN — MULTIPLE VITAMINS W/ MINERALS TAB 1 TABLET: TAB at 08:11

## 2020-02-05 RX ADMIN — DIAZEPAM 10 MG: 5 TABLET ORAL at 09:14

## 2020-02-05 RX ADMIN — POTASSIUM CHLORIDE 40 MEQ: 1500 TABLET, EXTENDED RELEASE ORAL at 18:14

## 2020-02-05 RX ADMIN — POTASSIUM CHLORIDE 40 MEQ: 1500 TABLET, EXTENDED RELEASE ORAL at 12:07

## 2020-02-05 RX ADMIN — POTASSIUM CHLORIDE 40 MEQ: 1500 TABLET, EXTENDED RELEASE ORAL at 06:28

## 2020-02-05 RX ADMIN — PANTOPRAZOLE SODIUM 40 MG: 40 TABLET, DELAYED RELEASE ORAL at 16:39

## 2020-02-05 RX ADMIN — TOPICAL ANESTHETIC 1 ML: 200 SPRAY DENTAL; PERIODONTAL at 14:32

## 2020-02-05 RX ADMIN — THIAMINE HYDROCHLORIDE 200 MG: 100 INJECTION, SOLUTION INTRAMUSCULAR; INTRAVENOUS at 16:41

## 2020-02-05 ASSESSMENT — ACTIVITIES OF DAILY LIVING (ADL)
ADLS_ACUITY_SCORE: 18
ADLS_ACUITY_SCORE: 16
ADLS_ACUITY_SCORE: 18

## 2020-02-05 NOTE — PHARMACY-ADMISSION MEDICATION HISTORY
Pharmacy Medication History  Admission medication history interview status for the 2/4/2020  admission is complete. See EPIC admission navigator for prior to admission medications     Medication history sources: Patient, Patient's family/friend (wife) and Pharmacy ([facility] in [place])  Medication history source reliability: Moderate  Adherence assessment: Poor    Significant changes made to the medication list:  Removed chantix, percocet, robaxin, hydrochlorothiazide -all from 2018.  Hydrochlorothiazide 25mg daily last filled 6/14/2018. Added asa per pt/wife. Added Betamethasone per pharmacy and wife. It was filled Aug 2019      Additional medication history information:   Patient occasionally has taken B vitamins    Medication reconciliation completed by provider prior to medication history? No    Time spent in this activity: 15 minutes      Prior to Admission medications    Medication Sig Last Dose Taking? Auth Provider   aspirin (ASA) 325 MG EC tablet Take 650-975 mg by mouth daily as needed for moderate pain prn Yes Unknown, Entered By History   betamethasone dipropionate (DIPROSONE) 0.05 % external ointment Apply topically 2 times daily as needed prn Yes Unknown, Entered By History   Blood Pressure Monitoring KIT Use as directed   Charles Gardner MD   EPINEPHrine (EPIPEN/ADRENACLICK/OR ANY BX GENERIC EQUIV) 0.3 MG/0.3ML injection 2-pack Inject 0.3 mg into the muscle as needed for anaphylaxis Unknown at Unknown time  Reported, Patient

## 2020-02-05 NOTE — PLAN OF CARE
CIWA 9 this AM. Was Given 10 mg of Valium. CIWA was subsequently 4 the rest of the shift. C/O. Mildly nauseated. Taking clears without issue. C/O a sore throat. Hurricane spray administered with relief. Patient is unsteady on his feet. To be transferred to  this shift.

## 2020-02-05 NOTE — PLAN OF CARE
Pt oriented except to time. K 2.6 replaced this morning - to receive additional 40meq q2h x2 per hospitalist order - total of 120mEq. Sore throat paged hospitalist - throat spray ordered. Na 128. NC 4L placed while sleeping, otherwise sating adequately. Voiding adequately via urinal. Wife at bedside this morning, updated on plan of care. Continue to monitor.

## 2020-02-05 NOTE — ED NOTES
DATE:  2/4/2020   TIME OF RECEIPT FROM LAB:  1843  LAB TEST:  Sodium  LAB VALUE:  119  RESULTS GIVEN WITH READ-BACK TO (PROVIDER):  Dr. Guzmán  TIME LAB VALUE REPORTED TO PROVIDER:   1843

## 2020-02-05 NOTE — H&P
[facility]    History and Physical - Hospitalist Service       Date of Admission:  2/4/2020    Assessment & Plan   Pierce Sullivan is a 59 year old male with history of alcohol abuse, GERD, ulcers, tobacco use, and cervical stenosis who presented to the ED with concerns of alcohol withdrawal.  He had been trying to wean himself but continues to vomit any oral intake. In the ED he had a seizure - arguably due to alcohol withdrawal and/or hyponatremia. He is admitted to ICU for further management.    Alcohol withdrawal with seizure in ED  History of alcohol abuse and treatment  Trying to wean ETOH at home for 4 days, vomiting the 4-5 beers he was having daily. Last drink this morning 2/4. No history of seizures reported, but does appear to have had one in ED - incontinence noted, post ictal.  Received valium IV twice in ED. Banana bag also given in addition to potassium. AST 91. Lactic acid likely due to alcoholism and dehydration/poor PO intake.  - CT head normal  - ICU admission  - Seizure precautions  - CIWA precautions with diazepam, prn clonidine, and may need dexmedetomidine (ICU consult placed in case of this, ED MD discussed with Intensivist)  - IV thiamine, folic acid, MV, subsequently PO when tolerated  - SW/Chem dep consulted  - Psychiatry consulted     Hypochloremic hyponatremia  Hyponatremia, hypomagnesemia  Non anion gap metabolic alkalosis  Likely secondary to minimal PO intake and GI losses combined with longstanding alcoholism. Initially K 2.1, now increased to 2.4. Na 119, Cl 65, HCO3 43, Mag 1.8.   - IV banana bag in ED, IV and PO K given  - awaiting recheck labs - will follow closely  - IV NS + 20 meq KCl to start at 75 for now   - **Na came back at 123 at 21:40  (119 at 20:00); K now 2.8  - Goal sodium at 20:00 2/5 would be ~125-127  - Sodium q4h - next at 0200  - Please page oncall hospitalist with results    Tobacco abuse  Current smoker, however minimal use since feeling ill for  past 5 days.  - Declines patch upon admission.  - Smoking cessation encouraged    History of GERD  Reported remote history of ulcer disease   Not on PTA. Some semblance of worsened reflux in relation to recent vomiting.  - no reports of hematemesis or coffee ground emesis, no melena  - will start protonix iv     History of cervical stenosis  Stable. No new symptoms reported.      Diet: clear liquids for now  DVT Prophylaxis: Pneumatic Compression Devices  Soto Catheter: not present  Code Status: Full Code    Disposition Plan   Expected discharge: likely a few days in hospital, will be admitted to ICU   Entered: Tu Vasquez MD 02/04/2020, 10:30 PM     The patient's care was discussed with the Bedside Nurse, Patient, Patient's Family and ED MD.    Tu Vasquez MD  [facility]    ______________________________________________________________________    Chief Complaint   Alcohol withdrawal    History is obtained from the patient    History of Present Illness   Pierce Sullivan is a 59 year old male with history of alcohol abuse, GERD, ulcers, tobacco use, and cervical stenosis who presented to the ED with concerns of alcohol withdrawal.  Patient has been trying to wean himself from alcohol for the past 4 days but has been having nausea and vomiting anytime he eats or drinks anything.  Is been having several 3.2% alcohol beers each day and his last drink was this morning.  He has been having what sounds like tremors but denies any seizure-like activity recently or in the past.  He denies blood in his vomit or coffee-ground emesis.  He also denies bloody/maroon or black stool.  He has not had a bowel movement in several days but is not feeling distended and he has been passing gas.  He has not kept any food down for several days. He otherwise denies recent illness, no fevers, cough, SOB, dysuria, diarrhea, or lower extremity edema. He denies taking any medications daily and he has only had 3-4  cigarettes over the past week because of how poorly he was feeling.    In the ED he was seen by Dr Guzmán with whom I have discussed the case. Patient is currently afebrile, hemodynamically stable, and saturating normally on room air.  While patient was in ER he was initially in behavioral health but subsequently moved to stabilization room as he had approximately 40 second seizure.  This occurred around 6:30 PM and was associated with incontinence of urine and posturing.  Initial labs show significant electrolyte abnormalities with sodium of 119, potassium of 2.1 improving to 2.4, chloride 65 bicarb 43, calcium 11.1, total bili 2.1, ALT 52, AST 91, lactic acid 3.2.  WBC elevated at 15.5, Hgb 16.2, .  UA fairly unremarkable.  CT head no acute pathology.  Sounds as if he was somewhat postictal but is now awake alert and oriented x3 around 10 PM as I see him.  Wife and mother-in-law at bedside.  In the ED he has received IV Valium, IV and p.o. potassium and a banana bag.  He will be admitted to the ICU for close monitoring, CIWA protocol, and normal saline with potassium maintenance, electrolyte replacement, ICU consult for potential of needing dexmedetomidine.      Review of Systems    The 10 point Review of Systems is negative other than noted in the HPI or here.     Past Medical History    I have reviewed this patient's medical history and updated it with pertinent information if needed.   Past Medical History:   Diagnosis Date     Allergy      Arthritis     spine and neck     Asthma      Gastroesophageal reflux disease      Ulcer, gastric, acute      Urticaria     Gets hives unknown origin       Past Surgical History   I have reviewed this patient's surgical history and updated it with pertinent information if needed.  Past Surgical History:   Procedure Laterality Date     COLONOSCOPY       HERNIA REPAIR       ORTHOPEDIC SURGERY      torn quadrecep repair     Quadracep Tear Right     Repair       Social  History   I have reviewed this patient's social history and updated it with pertinent information if needed.  Social History     Tobacco Use     Smoking status: Current Every Day Smoker     Packs/day: 0.50     Years: 30.00     Pack years: 15.00     Types: Cigarettes     Smokeless tobacco: Never Used   Substance Use Topics     Alcohol use: Yes     Comment: Had been sober for years, started drinking 1 wk ago     Drug use: No       Family History   I have reviewed this patient's family history and updated it with pertinent information if needed.   Family History   Problem Relation Age of Onset     Other - See Comments Mother         cardiac dysrhythmia     Other Cancer Father         melanoma     Other Cancer Sister         thyroid Ca     Hypertension Other         uncertain     Coronary Artery Disease Paternal Grandfather      Diabetes No family hx of      Colon Cancer No family hx of      Prostate Cancer No family hx of      Cerebrovascular Disease No family hx of        Prior to Admission Medications   Prior to Admission Medications   Prescriptions Last Dose Informant Patient Reported? Taking?   Blood Pressure Monitoring KIT   No No   Sig: Use as directed   EPINEPHrine (EPIPEN/ADRENACLICK/OR ANY BX GENERIC EQUIV) 0.3 MG/0.3ML injection 2-pack Unknown at Unknown time  Yes No   Sig: Inject 0.3 mg into the muscle as needed for anaphylaxis   aspirin (ASA) 325 MG EC tablet prn  Yes Yes   Sig: Take 650-975 mg by mouth daily as needed for moderate pain      Facility-Administered Medications: None     Allergies   Allergies   Allergen Reactions     Bee Venom      Penicillins      As a child, but has had since       Physical Exam   Vital Signs: Temp: 99.7  F (37.6  C) Temp src: Temporal BP: 126/88 Pulse: 87 Heart Rate: 91 Resp: 15 SpO2: 96 % O2 Device: None (Room air)    Weight: 200 lbs 0 oz      Gen: NAD, pleasant  HEENT: Normocephalic, EOMI, MMM  Resp: no crackles,  no wheezes, no increased work of resp  CV: S1S2 heard, reg  rhythm, reg rate, no pedal edema  Abdo: soft, nontender, nondistended, bowel sounds present  Ext: calves nontender, well perfused  Neuro: AAOx3, CN grossly intact, no facial asymmetry, mildly tremulous       Data   Data reviewed today: I reviewed all medications, new labs and imaging results over the last 24 hours. I personally reviewed no images or EKG's today.    Recent Labs   Lab 02/04/20 2140 02/04/20 1952 02/04/20  1754   WBC  --   --  15.5*   HGB  --   --  16.2   MCV  --   --  81   PLT  --   --  169   * 119* 119*   POTASSIUM 2.8* 2.4* 2.1*   CHLORIDE  --   --  65*   CO2  --   --  43*   BUN  --   --  15   CR  --   --  0.76   ANIONGAP  --   --  11   LEIF  --   --  11.1*   GLC  --   --  155*   ALBUMIN  --   --  4.1   PROTTOTAL  --   --  7.5   BILITOTAL  --   --  2.1*   ALKPHOS  --   --  85   ALT  --   --  52   AST  --   --  91*     Recent Results (from the past 24 hour(s))   Head CT w/o contrast    Narrative    CT SCAN OF THE HEAD WITHOUT CONTRAST   2/4/2020 9:40 PM     HISTORY: Seizure, history of EtOH.    TECHNIQUE:  Axial images of the head and coronal reformations without  IV contrast material.  Radiation dose for this scan was reduced using  automated exposure control, adjustment of the mA and/or kV according  to patient size, or iterative reconstruction technique.    COMPARISON: None.    FINDINGS:  The ventricles are normal in size, shape and configuration.   The brain parenchyma and subarachnoid spaces are normal. There is no  evidence of intracranial hemorrhage, mass, acute infarct or anomaly.     The visualized portions of the sinuses and mastoids appear normal.  There is no evidence of trauma.      Impression    IMPRESSION: Normal CT scan of the head.      KACY CARRIZALES MD

## 2020-02-05 NOTE — PROGRESS NOTES
[facility]    Hospitalist Progress Note    Assessment & Plan   59 year old male who was admitted on 2/4/2020 with nausea, vomiting and low sodium:    Impression:   Principal Problem:    Alcohol dependence with withdrawal   Active Problems:    Hyponatremia    Hypokalemia    Alcoholic hepatitis without ascites    Smoker    Nausea with vomiting      Plan:  Continue present fluids and potassium replacement, transfer to floor.     DVT Prophylaxis: Pneumatic Compression Devices  Code Status: Full Code    Disposition: Expected discharge in 2 days.     Tom Graham MD  Pager [phone]  Cell Phone [phone]  Text Page (7am to 6pm)  (35 min total)  Interval History   Reports sore throat, difficulty swallowing, but was able to swallow potassium pills.  Generalized aching.      Physical Exam   Temp: 97.9  F (36.6  C) Temp src: Axillary BP: 107/63 Pulse: 71 Heart Rate: 75 Resp: 17 SpO2: 99 % O2 Device: None (Room air)    Vitals:    02/04/20 1920 02/05/20 0000   Weight: 90.7 kg (200 lb) 100 kg (220 lb 7.4 oz)     Vital Signs with Ranges  Temp:  [97.9  F (36.6  C)-99.7  F (37.6  C)] 97.9  F (36.6  C)  Pulse:  [] 71  Heart Rate:  [] 75  Resp:  [7-36] 17  BP: ()/() 107/63  SpO2:  [91 %-100 %] 99 %  I/O last 3 completed shifts:  In: 2579.58 [I.V.:1579.58; IV Piggyback:1000]  Out: 1925 [Urine:1925]    # Pain Assessment:  Current Pain Score 2/5/2020   Patient currently in pain? yes   Pain score (0-10) 5   Pain descriptors -   - Pierce is experiencing pain due to alcohol withdrawal. Pain management was discussed and the plan was created in a collaborative fashion.  Pierce's response to the current recommendations: engaged  - Please see the plan for pain management as documented above    Constitutional: Awake, alert, cooperative, no apparent distress  Respiratory: Clear to auscultation bilaterally, no crackles or wheezing  Cardiovascular: Regular rate and rhythm, normal S1 and S2,  and no murmur noted  GI: Normal bowel sounds, soft, non-distended, non-tender  Extrem: No calf tenderness, no ankle edema  Neuro: Ox3, no focal motor or sensory deficits    Medications     0.9% sodium chloride + KCl 20 mEq/L 125 mL/hr at 02/05/20 0802       cloNIDine  0.1 mg Oral Q8H     [START ON 2/7/2020] folic acid  1 mg Oral Daily     folic acid  1 mg Intravenous Daily     multivitamin w/minerals  1 tablet Oral Daily     pantoprazole  40 mg Oral BID AC     potassium chloride  40 mEq Oral Q2H     thiamine  200 mg Intravenous TID     [START ON 2/7/2020] thiamine  100 mg Oral TID     [START ON 2/12/2020] thiamine  100 mg Oral Daily       Data   Recent Labs   Lab 02/05/20  0555 02/05/20  0208 02/04/20  2252 02/04/20  2140 02/04/20  1952 02/04/20  1754   WBC 13.6*  --   --   --   --  15.5*   HGB 13.3  --   --   --   --  16.2   MCV 83  --   --   --   --  81   *  --   --   --   --  169   *  Canceled, Test credited 126* 122* 123* 119* 119*   POTASSIUM 2.6*  --   --  2.8* 2.4* 2.1*   CHLORIDE 87*  --   --   --   --  65*   CO2 34*  --   --   --   --  43*   BUN 17  --   --   --   --  15   CR 0.82  --   --   --   --  0.76   ANIONGAP 7  --   --   --   --  11   LEIF 8.7  --   --   --   --  11.1*   *  --   --   --   --  155*   ALBUMIN  --   --   --   --   --  4.1   PROTTOTAL  --   --   --   --   --  7.5   BILITOTAL  --   --   --   --   --  2.1*   ALKPHOS  --   --   --   --   --  85   ALT  --   --   --   --   --  52   AST  --   --   --   --   --  91*   LIPASE  --   --  105  --   --   --        Imaging:   Recent Results (from the past 24 hour(s))   Head CT w/o contrast    Narrative    CT SCAN OF THE HEAD WITHOUT CONTRAST   2/4/2020 9:40 PM     HISTORY: Seizure, history of EtOH.    TECHNIQUE:  Axial images of the head and coronal reformations without  IV contrast material.  Radiation dose for this scan was reduced using  automated exposure control, adjustment of the mA and/or kV according  to patient size, or  iterative reconstruction technique.    COMPARISON: None.    FINDINGS:  The ventricles are normal in size, shape and configuration.   The brain parenchyma and subarachnoid spaces are normal. There is no  evidence of intracranial hemorrhage, mass, acute infarct or anomaly.     The visualized portions of the sinuses and mastoids appear normal.  There is no evidence of trauma.      Impression    IMPRESSION: Normal CT scan of the head.      KACY CARRIZALES MD

## 2020-02-05 NOTE — CONSULTS
"Critical Care  Note      02/04/2020    Name: Pierce Sullivan MRN#: [id number]   Age: 59 year old YOB: 1960     [facility]    [facility] DAY #             Problem List:   Principal Problem:    Alcohol dependence with withdrawal   Active Problems:    Hyponatremia    Hypokalemia    Alcoholic hepatitis without ascites    Alcohol withdrawal (H)           HPI:     Mr. Pierce Sullivan is a 58yo male with a history of alcohol use disorder who presented to the [facility]  ER  Due to concerns for ongoing ETOH use.  The patient reports that he was sober for 10 years, but had relapses this past July and September, and then again 4 days ago.  4 days ago, he started drinking 1 quart of whiskey for 3 days, followed by beer 1 day ago and today (day of admission).  He endorses significant nausea and vomiting during this time and was only able to eat a few bananas and a piece of toast in the past few days; these symptoms and wanting to stop drinking are what brought him into the ER this evening.      In the  ER, the patient was found to have multiple electrolyte derangements including hyponatremia to 119, hypokalemia (2.1), a prolonged QTC, and elevated lactic acid.   He also reportedly had a seizure.      Currently, he reports anxiety, nausea, abdominal pain, and throat pain.  ROS is otherwise negative.    Of note, he also has been smoking 1-1.5ppd since his teens, but has only had 6 cigarettes in the past 4 days. He is asking for \"something to take the edge off.\"        Assessment and plan :     Pierce Sullivan IS a 59 year old male admitted on 2/4/2020 for ETOH withdrawal, possible ETOH withdrawal seizure, and multiple electrolyte derrangements.     I have personally reviewed the daily labs, imaging studies, cultures and discussed the case with referring physician and consulting physicians.     My assessment and plan by system for this patient is as follows:    Neurology/Psychiatry:   1. Alcohol use disorder  2. " Pain/analgesia  3. Anxiety- related to ETOH use disorder  4. Delirium- not present  5. Possible seizure in the ER  Plan  - CIWA protocol for now in addition to PRN clonidine, would hold off on precedex for now unless patient becomes more agitated and requires more IV benzodiazepines; additionally, would not anticipate signs/sx of withdrawal until 48 hours for now  - agree with chemical dependency  consult  - no acute indications for opiates; if issues with pain would start with acetaminophen  - unclear if patient was truly post ictal- he is alert and oriented on my examination; however, with hyponatremia + 4 days s/p last drink, this is possible.  Consider EEG monitoring pending clinical  course    Cardiovascular:   1.Hemodynamics -  stable  2.Rhythm - NSR, prolonged QTC (most recently 553)  3. Ischemia - none  Plan  - continue telemetry monitoring  - will need to trend QTC    Pulmonary/Ventilator Management:   1. Airway: protected  2. Oxygenation/ventilation/mechanics: breathing comfortably on room  air  Plan  - SPO2 monitoring; no evidence of aspiration at this time    GI and Nutrition :   Nutrition  Likely esophagitis in the setting of frequent vomiting (no blood in vomit, no melena)  Plan  -clear liquids  - PPI started    Renal/Fluids/Electrolytes:   1. Normal renal function; appears to be around baseline of  0.66 (June 2018)  2. Electrolyte abnormality: multiple  - hyponatremia, suspect chronic due to low solute.  Goal is to correct to 8mEQ/24hrs  - hypokalemia  - hyperchloremia  3. Acid/base status: lactic acidosis, elevated to 3.2 on arrival to ER  4. Volume status  Plan  - received 2% saline peripherally+1L NS in the ER with correction from 119->123.  Now on 75cc/h of NS + potassium  - trend sodium Q4H  - continue potassium replacement and trend Q4H  - monitor function and electrolytes as needed with replacement per ICU protocols.   - generally avoid nephrotoxic agents such as NSAID, IV contrast unless  specifically required  - adjust medications as needed for renal clearance  - follow I/O's as appropriate.    Infectious Disease:   No evidence of acute infectious process; suspect elevated WBC is related to stress  Plan  - monitor fever curve an WBC    Endocrine:   No acute issues, no history of DM 2  Plan  - ICU insulin protocol, goal sugar <180    Hematology/Oncology:   No acute issues  Plan  - monitor CBC, can stop checking if stable in the next 24 hours     IV/Access:   1. Venous access - PIV x2  2. Arterial access - not indicated  Plan  - central access not required      ICU Prophylaxis:   1. DVT: mechanical  2. VAP: not indicated  3. Stress Ulcer: PPI due to history of PUD  4. Restraints: not indicated  5. Wound care  - not indicated  6. Feeding - clear liquid diet  7. Disposition - ICU        Key goals for next 24 hours:   1. Continue CIWA monitoring, with frequent assessments to determine if precedex is needed.  Not needed currently and do not expect him to need this until he stars withdrawing  2. Trend sodium- may need to slow down rate of correction (ideally should be 8meq/24 hours- so 127 around 5pm tomorrow  3. Aggressive potassium replacement, monitor EKG and trend potassium               Medical History:     Past Medical History:   Diagnosis Date     Allergy      Arthritis     spine and neck     Asthma      Gastroesophageal reflux disease      Ulcer, gastric, acute      Urticaria     Gets hives unknown origin     Past Surgical History:   Procedure Laterality Date     COLONOSCOPY       HERNIA REPAIR       ORTHOPEDIC SURGERY      torn quadrecep repair     Quadracep Tear Right     Repair     Social History     Socioeconomic History     Marital status:      Spouse name: Not on file     Number of children: Not on file     Years of education: Not on file     Highest education level: Not on file   Occupational History     Not on file   Social Needs     Financial resource strain: Not on file     Food  insecurity:     Worry: Not on file     Inability: Not on file     Transportation needs:     Medical: Not on file     Non-medical: Not on file   Tobacco Use     Smoking status: Current Every Day Smoker     Packs/day: 0.50     Years: 30.00     Pack years: 15.00     Types: Cigarettes     Smokeless tobacco: Never Used   Substance and Sexual Activity     Alcohol use: Yes     Comment: Had been sober for years, started drinking 1 wk ago     Drug use: No     Sexual activity: Not on file   Lifestyle     Physical activity:     Days per week: Not on file     Minutes per session: Not on file     Stress: Not on file   Relationships     Social connections:     Talks on phone: Not on file     Gets together: Not on file     Attends Hindu service: Not on file     Active member of club or organization: Not on file     Attends meetings of clubs or organizations: Not on file     Relationship status: Not on file     Intimate partner violence:     Fear of current or ex partner: Not on file     Emotionally abused: Not on file     Physically abused: Not on file     Forced sexual activity: Not on file   Other Topics Concern     Parent/sibling w/ CABG, MI or angioplasty before 65F 55M? Not Asked   Social History Narrative     Not on file        Allergies   Allergen Reactions     Bee Venom      Penicillins      As a child, but has had since              Jones Medications:       cloNIDine  0.1 mg Oral Q8H     [START ON 2/7/2020] folic acid  1 mg Oral Daily     [START ON 2/5/2020] folic acid  1 mg Intravenous Daily     [START ON 2/5/2020] multivitamin w/minerals  1 tablet Oral Daily     pantoprazole (PROTONIX) IV  40 mg Intravenous Daily with breakfast     [START ON 2/5/2020] thiamine  200 mg Intravenous TID     [START ON 2/7/2020] thiamine  100 mg Oral TID     [START ON 2/12/2020] thiamine  100 mg Oral Daily       0.9% sodium chloride + KCl 20 mEq/L 125 mL/hr at 02/04/20 3493        Home Meds  No current facility-administered medications on  file prior to encounter.   aspirin (ASA) 325 MG EC tablet, Take 650-975 mg by mouth daily as needed for moderate pain  Blood Pressure Monitoring KIT, Use as directed  EPINEPHrine (EPIPEN/ADRENACLICK/OR ANY BX GENERIC EQUIV) 0.3 MG/0.3ML injection 2-pack, Inject 0.3 mg into the muscle as needed for anaphylaxis               Physical Examination:   Temp:  [98.8  F (37.1  C)-99.7  F (37.6  C)] 99.6  F (37.6  C)  Pulse:  [] 80  Heart Rate:  [] 86  Resp:  [9-36] 28  BP: ()/() 123/92  SpO2:  [91 %-100 %] 98 %    Intake/Output Summary (Last 24 hours) at 2/4/2020 2314  Last data filed at 2/4/2020 1919  Gross per 24 hour   Intake 2000 ml   Output --   Net 2000 ml     Wt Readings from Last 4 Encounters:   02/04/20 90.7 kg (200 lb)   07/03/18 89.8 kg (198 lb)   06/14/18 91.2 kg (201 lb)   04/19/18 90.7 kg (200 lb)     BP - Mean:  [] 107  Resp: 28    No lab results found in last 7 days.    GEN: no acute distress   HEENT: head ncat, sclera anicteric, OP patent, trachea midline   PULM: unlabored synchronous with vent, clear anteriorly    CV/COR: RRR S1S2 no gallop,  No rub, no murmur  ABD: soft nontender, hypoactive bowel sounds, no mass  EXT:  Edema   warm  NEURO: grossly intact  SKIN: no obvious rash  LINES: clean, dry intact         Data:   All data and imaging reviewed     ROUTINE ICU LABS (Last four results)  CMP  Recent Labs   Lab 02/04/20 2252 02/04/20 2140 02/04/20 1952 02/04/20  1754   * 123* 119* 119*   POTASSIUM  --  2.8* 2.4* 2.1*   CHLORIDE  --   --   --  65*   CO2  --   --   --  43*   ANIONGAP  --   --   --  11   GLC  --   --   --  155*   BUN  --   --   --  15   CR  --   --   --  0.76   GFRESTIMATED  --   --   --  >90   GFRESTBLACK  --   --   --  >90   LEIF  --   --   --  11.1*   MAG  --   --   --  1.8   PROTTOTAL  --   --   --  7.5   ALBUMIN  --   --   --  4.1   BILITOTAL  --   --   --  2.1*   ALKPHOS  --   --   --  85   AST  --   --   --  91*   ALT  --   --   --  52      CBC  Recent Labs   Lab 02/04/20  1754   WBC 15.5*   RBC 5.27   HGB 16.2   HCT 42.5   MCV 81   MCH 30.7   MCHC 38.1*   RDW 12.0        INRNo lab results found in last 7 days.  Arterial Blood GasNo lab results found in last 7 days.    All cultures:  No results for input(s): CULT in the last 168 hours.  Recent Results (from the past 24 hour(s))   Head CT w/o contrast    Narrative    CT SCAN OF THE HEAD WITHOUT CONTRAST   2/4/2020 9:40 PM     HISTORY: Seizure, history of EtOH.    TECHNIQUE:  Axial images of the head and coronal reformations without  IV contrast material.  Radiation dose for this scan was reduced using  automated exposure control, adjustment of the mA and/or kV according  to patient size, or iterative reconstruction technique.    COMPARISON: None.    FINDINGS:  The ventricles are normal in size, shape and configuration.   The brain parenchyma and subarachnoid spaces are normal. There is no  evidence of intracranial hemorrhage, mass, acute infarct or anomaly.     The visualized portions of the sinuses and mastoids appear normal.  There is no evidence of trauma.      Impression    IMPRESSION: Normal CT scan of the head.      KACY CARRIZALES MD         Billing: This patient is critically ill: Yes. Total critical care time today 40 min.            Amalia Fox MD   of Medicine  Division of Pulmonary, Critical Care, Allergy, and Sleep Medicine  Pager [phone]

## 2020-02-05 NOTE — ED NOTES
Family member out to desk complaining of patient acting weird. Patient was slow to respond to staff. Patient was diaphoretic. Patient then became incontinent of urine and stiffened up for approximately 40 seconds. Staff assist called. MD at bedside. Both side rails put up and seizure pads applied. Patient slowly responding to MD.

## 2020-02-05 NOTE — PROGRESS NOTES
DATE & TIME: 2/5/2020 9279-3542   Cognitive Concerns/ Orientation : A&Ox4  BEHAVIOR & AGGRESSION TOOL COLOR: Green  CIWA SCORE: 3  ABNL VS/O2: VSS on RA  MOBILITY: SBA  PAIN MANAGMENT: Tylenol for HA  DIET: Clear liquids  BOWEL/BLADDER: continent of B&B  ABNL LAB/BG: Na+ 132, K+ 3.1 (replaced), WBC 13.6  DRAIN/DEVICES: None  TELEMETRY RHYTHM: NA  SKIN: intact  TESTS/PROCEDURES: None  D/C DAY/GOALS/PLACE: discharge pending  OTHER IMPORTANT INFO:  Seizure precautions. Pt's has hard time swallowing pills d/t vomiting/wrenching, carafate ordered and K+ protocol for options of receiving K+Cl replacement.

## 2020-02-05 NOTE — ED NOTES
DATE:  2/4/2020   TIME OF RECEIPT FROM LAB:  1843  LAB TEST:  Potassium  LAB VALUE:  2.1  RESULTS GIVEN WITH READ-BACK TO (PROVIDER):  Dr. Guzmán  TIME LAB VALUE REPORTED TO PROVIDER:   5293

## 2020-02-06 VITALS
SYSTOLIC BLOOD PRESSURE: 132 MMHG | BODY MASS INDEX: 30.75 KG/M2 | OXYGEN SATURATION: 98 % | RESPIRATION RATE: 18 BRPM | DIASTOLIC BLOOD PRESSURE: 84 MMHG | WEIGHT: 220.46 LBS | TEMPERATURE: 97.5 F | HEART RATE: 58 BPM

## 2020-02-06 PROBLEM — D69.6 THROMBOCYTOPENIA (H): Status: ACTIVE | Noted: 2020-02-06

## 2020-02-06 LAB
ANION GAP SERPL CALCULATED.3IONS-SCNC: 4 MMOL/L (ref 3–14)
BUN SERPL-MCNC: 18 MG/DL (ref 7–30)
CALCIUM SERPL-MCNC: 8 MG/DL (ref 8.5–10.1)
CHLORIDE SERPL-SCNC: 108 MMOL/L (ref 94–109)
CO2 SERPL-SCNC: 26 MMOL/L (ref 20–32)
CREAT SERPL-MCNC: 0.69 MG/DL (ref 0.66–1.25)
ERYTHROCYTE [DISTWIDTH] IN BLOOD BY AUTOMATED COUNT: 12.3 % (ref 10–15)
GFR SERPL CREATININE-BSD FRML MDRD: >90 ML/MIN/{1.73_M2}
GLUCOSE SERPL-MCNC: 89 MG/DL (ref 70–99)
HCT VFR BLD AUTO: 35.2 % (ref 40–53)
HGB BLD-MCNC: 12.6 G/DL (ref 13.3–17.7)
MCH RBC QN AUTO: 30.2 PG (ref 26.5–33)
MCHC RBC AUTO-ENTMCNC: 35.8 G/DL (ref 31.5–36.5)
MCV RBC AUTO: 84 FL (ref 78–100)
PLATELET # BLD AUTO: 108 10E9/L (ref 150–450)
POTASSIUM SERPL-SCNC: 4 MMOL/L (ref 3.4–5.3)
RBC # BLD AUTO: 4.17 10E12/L (ref 4.4–5.9)
SODIUM SERPL-SCNC: 138 MMOL/L (ref 133–144)
WBC # BLD AUTO: 8.4 10E9/L (ref 4–11)

## 2020-02-06 PROCEDURE — 80048 BASIC METABOLIC PNL TOTAL CA: CPT | Performed by: INTERNAL MEDICINE

## 2020-02-06 PROCEDURE — 85027 COMPLETE CBC AUTOMATED: CPT | Performed by: INTERNAL MEDICINE

## 2020-02-06 PROCEDURE — 99239 HOSP IP/OBS DSCHRG MGMT >30: CPT | Performed by: INTERNAL MEDICINE

## 2020-02-06 PROCEDURE — 25000128 H RX IP 250 OP 636: Performed by: INTERNAL MEDICINE

## 2020-02-06 PROCEDURE — 25000132 ZZH RX MED GY IP 250 OP 250 PS 637: Performed by: INTERNAL MEDICINE

## 2020-02-06 PROCEDURE — 36415 COLL VENOUS BLD VENIPUNCTURE: CPT | Performed by: INTERNAL MEDICINE

## 2020-02-06 RX ORDER — FAMOTIDINE 40 MG/1
TABLET, FILM COATED ORAL
Qty: 60 TABLET | Refills: 0 | Status: SHIPPED | OUTPATIENT
Start: 2020-02-06 | End: 2022-11-29

## 2020-02-06 RX ORDER — ACETAMINOPHEN 325 MG/1
650 TABLET ORAL EVERY 4 HOURS PRN
Refills: 0
Start: 2020-02-06

## 2020-02-06 RX ORDER — FAMOTIDINE 20 MG/1
40 TABLET, FILM COATED ORAL 2 TIMES DAILY
Status: DISCONTINUED | OUTPATIENT
Start: 2020-02-06 | End: 2020-02-06 | Stop reason: HOSPADM

## 2020-02-06 RX ADMIN — CLONIDINE HYDROCHLORIDE 0.1 MG: 0.1 TABLET ORAL at 15:45

## 2020-02-06 RX ADMIN — CLONIDINE HYDROCHLORIDE 0.1 MG: 0.1 TABLET ORAL at 05:56

## 2020-02-06 RX ADMIN — SUCRALFATE 1 G: 1 SUSPENSION ORAL at 05:56

## 2020-02-06 RX ADMIN — POTASSIUM CHLORIDE AND SODIUM CHLORIDE: 900; 150 INJECTION, SOLUTION INTRAVENOUS at 08:56

## 2020-02-06 RX ADMIN — ACETAMINOPHEN 650 MG: 325 TABLET, FILM COATED ORAL at 06:01

## 2020-02-06 RX ADMIN — POTASSIUM CHLORIDE AND SODIUM CHLORIDE: 900; 150 INJECTION, SOLUTION INTRAVENOUS at 00:44

## 2020-02-06 RX ADMIN — FAMOTIDINE 40 MG: 20 TABLET, FILM COATED ORAL at 15:45

## 2020-02-06 RX ADMIN — MULTIPLE VITAMINS W/ MINERALS TAB 1 TABLET: TAB at 08:56

## 2020-02-06 RX ADMIN — PANTOPRAZOLE SODIUM 40 MG: 40 TABLET, DELAYED RELEASE ORAL at 05:56

## 2020-02-06 ASSESSMENT — ACTIVITIES OF DAILY LIVING (ADL)
ADLS_ACUITY_SCORE: 17
ADLS_ACUITY_SCORE: 17
COGNITION: 0 - NO COGNITION ISSUES REPORTED
RETIRED_COMMUNICATION: 0-->UNDERSTANDS/COMMUNICATES WITHOUT DIFFICULTY
SWALLOWING: 0-->SWALLOWS FOODS/LIQUIDS WITHOUT DIFFICULTY
WHICH_OF_THE_ABOVE_FUNCTIONAL_RISKS_HAD_A_RECENT_ONSET_OR_CHANGE?: SWALLOWING
ADLS_ACUITY_SCORE: 13
AMBULATION: 0-->INDEPENDENT
ADLS_ACUITY_SCORE: 17
TOILETING: 0-->INDEPENDENT
RETIRED_EATING: 0-->INDEPENDENT
BATHING: 0-->INDEPENDENT
ADLS_ACUITY_SCORE: 17
FALL_HISTORY_WITHIN_LAST_SIX_MONTHS: NO
TRANSFERRING: 0-->INDEPENDENT
DRESS: 0-->INDEPENDENT

## 2020-02-06 NOTE — PLAN OF CARE
DATE & TIME: 2/6/2020 1372-0122                          Cognitive Concerns/ Orientation : A+Ox4   BEHAVIOR & AGGRESSION TOOL COLOR: Green  CIWA SCORE: Discontinued.            ABNL VS/O2: VSS, on RA.   MOBILITY: Up independently in room.   PAIN MANAGMENT: Denies pain.   DIET: Diet advanced to DD2 with thin liquids.  BOWEL/BLADDER: Continent of bladder.  No BM this shift, Miralax given as pt states 1 week without BM.  ABNL LAB/BG: Na . K+ WDL. WBC WDL.  DRAIN/DEVICES: NA  TELEMETRY RHYTHM: NA  SKIN: Facial flushing  TESTS/PROCEDURES: Chem-dep to see.   D/C DAY/GOALS/PLACE: Discharge to home today once tolerating diet after advancement, and CD sees.   OTHER IMPORTANT INFO: IVF and CIWA's discontinued. Seizure precautions in place. Nursing will continue to monitor.

## 2020-02-06 NOTE — DISCHARGE INSTRUCTIONS
If you decide you want a CD assessment you can arrange one thru [facility] by calling [phone].

## 2020-02-06 NOTE — PROGRESS NOTES
Hospitalist Cross Cover    Having hard time taking pills dt pain from vomiting/wretching. Have added carafate suspension. Have also added K replacement protocol.    Christin Ponce, DO  Internal Medicine - Hospitalist  2/5/2020  6:30 PM

## 2020-02-06 NOTE — PLAN OF CARE
DATE & TIME: 2/6/2020 1900-0730  Cognitive Concerns/ Orientation : A&Ox4  BEHAVIOR & AGGRESSION TOOL COLOR: Green  CIWA SCORE: 3/0/4 for headache and mild tremor  ABNL VS/O2: VSS on RA  MOBILITY: SBA  PAIN MANAGMENT: Tylenol given x2 for headache  DIET: Clear liquids  BOWEL/BLADDER: continent of B&B  ABNL LAB/BG: Na+ 132, K+ 3.1 (replaced), WBC 13.6  DRAIN/DEVICES: PIV infusing with 0.9 NS with 20 KCl at 125.  TELEMETRY RHYTHM: NA  SKIN: intact but flushed  TESTS/PROCEDURES: None  D/C DAY/GOALS/PLACE: discharge pending progress.  OTHER IMPORTANT INFO:  Seizure precautions in place. Pt has a sore throat from vomiting prior to arrival, carafate started.

## 2020-02-06 NOTE — PROGRESS NOTES
Discharge    Patient discharged to home via 603-1 with wife   Care plan note: Pt. VSS on room air. A & O x 4. Pt. Refused flu shot this afternoon. States he will receive it at a later time. Wife present at bedside. Pt. Belongings returned.     Listed belongings gathered and returned to patient. Yes  Care Plan and Patient education resolved: Yes  Prescriptions if needed, hard copies sent with patient  Yes  Home and hospital acquired medications returned to patient: Yes  Medication Bin checked and emptied on discharge Yes  Follow up appointment made for patient: No. Pt. Has decided to schedule his own appointment and follow up with chemical dependency.      Willa Fowler, RN on 2/6/2020 at 4:42 PM

## 2020-02-07 ENCOUNTER — TELEPHONE (OUTPATIENT)
Dept: FAMILY MEDICINE | Facility: CLINIC | Age: 60
End: 2020-02-07

## 2020-02-07 NOTE — TELEPHONE ENCOUNTER
ED / Discharge Outreach Protocol    Patient Contact    Attempt # 1    Was call answered?  No.  Left message on voicemail with information to call triage back.    *closed encounter in error     Debra ANDERSEN RN

## 2020-02-07 NOTE — TELEPHONE ENCOUNTER
Chief Complaint: Hyponatremia, Alcoholic Hepatitis Without Ascites,  THU 06-FEB-2020 0 / 1    [phone] (home) [phone] (work)

## 2020-02-07 NOTE — TELEPHONE ENCOUNTER
"ED/Discharge Protocol    \"Hi, my name is Debra Zelaya RN, a registered nurse, and I am calling on behalf of Dr. Gardner's office at [place].  I am calling to follow up and see how things are going for you after your recent visit.\"    \"I see that you were in the (ER/UC/IP) on 2/4-2/6.    How are you doing now that you are home?\" urinating frequently - IV fluids - denies other UTI symptoms. Feels \"dragged down and tired\"     Is patient experiencing symptoms that may require a hospital visit?  No     Discharge Instructions    \"Let's review your discharge instructions.  What is/are the follow-up recommendations?  Pt. Response: follow up with PCP     \"Were you instructed to make a follow-up appointment?\"  Pt. Response: Yes.  Has appointment been made?   No.  \"Can I help you schedule that appointment?\" yes       \"When you see the provider, I would recommend that you bring your discharge instructions with you.    Medications    \"How many new medications are you on since your hospitalization/ED visit?\"    0-1  \"How many of your current medicines changed (dose, timing, name, etc.) while you were in the hospital/ED visit?\"   0-1  \"Do you have questions about your medications?\"   No  \"Were you newly diagnosed with heart failure, COPD, diabetes or did you have a heart attack?\"   No  For patients on insulin: \"Did you start on insulin in the hospital or did you have your insulin dose changed?\"   No  Post Discharge Medication Reconciliation Status: discharge medications reconciled, continue medications without change.    Was MTM referral placed (*Make sure to put transitions as reason for referral)?   No    Call Summary    \"Do you have any questions or concerns about your condition or care plan at the moment?\"    No  Triage nurse advice given    Patient was in ER 2x in the past year (assess appropriateness of ER visits.)      \"If you have questions or things don't continue to improve, we encourage you contact us through the " "main clinic number,  ([phone]).  Even if the clinic is not open, triage nurses are available 24/7 to help you.     We would like you to know that our clinic has extended hours (provide information).  We also have urgent care (provide details on closest location and hours/contact info)\"      \"Thank you for your time and take care!\"    Debra ANDERSEN RN    "

## 2020-02-07 NOTE — TELEPHONE ENCOUNTER
Reason for Call:  Other returning call    Detailed comments: Pierce returning triage call for hospital f/u    Phone Number Patient can be reached at: Cell number on file:    Telephone Information:   Mobile [phone]       Best Time: any    Can we leave a detailed message on this number? YES    Call taken on 2/7/2020 at 2:14 PM by Pelon Phillips

## 2020-02-11 ENCOUNTER — OFFICE VISIT (OUTPATIENT)
Dept: FAMILY MEDICINE | Facility: CLINIC | Age: 60
End: 2020-02-11
Payer: COMMERCIAL

## 2020-02-11 VITALS
DIASTOLIC BLOOD PRESSURE: 90 MMHG | TEMPERATURE: 98.1 F | SYSTOLIC BLOOD PRESSURE: 136 MMHG | BODY MASS INDEX: 31.21 KG/M2 | HEART RATE: 78 BPM | WEIGHT: 218 LBS | OXYGEN SATURATION: 97 % | HEIGHT: 70 IN

## 2020-02-11 DIAGNOSIS — G89.29 CHRONIC NECK AND BACK PAIN: ICD-10-CM

## 2020-02-11 DIAGNOSIS — F10.20 UNCOMPLICATED ALCOHOL DEPENDENCE (H): Primary | ICD-10-CM

## 2020-02-11 DIAGNOSIS — M54.9 CHRONIC NECK AND BACK PAIN: ICD-10-CM

## 2020-02-11 DIAGNOSIS — Z23 NEED FOR PROPHYLACTIC VACCINATION AND INOCULATION AGAINST INFLUENZA: ICD-10-CM

## 2020-02-11 DIAGNOSIS — M54.2 CHRONIC NECK AND BACK PAIN: ICD-10-CM

## 2020-02-11 PROCEDURE — 90471 IMMUNIZATION ADMIN: CPT | Performed by: INTERNAL MEDICINE

## 2020-02-11 PROCEDURE — 99495 TRANSJ CARE MGMT MOD F2F 14D: CPT | Mod: 25 | Performed by: INTERNAL MEDICINE

## 2020-02-11 PROCEDURE — 90682 RIV4 VACC RECOMBINANT DNA IM: CPT | Performed by: INTERNAL MEDICINE

## 2020-02-11 ASSESSMENT — MIFFLIN-ST. JEOR: SCORE: 1822.58

## 2020-02-11 NOTE — PATIENT INSTRUCTIONS
Follow up with pain specialist.    Continue with your AA meetings and support groups.    Call doctor if you develop concerns over relapsing with alcohol use.

## 2020-02-11 NOTE — PROGRESS NOTES
"Subjective     Pierce Sullivan is a 59 year old male who presents to clinic today for the following health issues:    HPI       Hospital Follow-up Visit:    Hospital/Nursing Home/IP Rehab Facility: [facility]  Date of Admission: 2/4/2020  Date of Discharge: 2/6/2020  Reason(s) for Admission:   Alcohol dependence with withdrawal   Hyponatremia  Hypokalemia              Problems taking medications regularly:  None       Medication changes since discharge: None       Problems adhering to non-medication therapy:  None    Summary of hospitalization:  [facility] discharge summary reviewed  Diagnostic Tests/Treatments reviewed.  Follow up needed: none  Other Healthcare Providers Involved in Patient s Care:           Update since discharge: improved.     Post Discharge Medication Reconciliation: discharge medications reconciled, continue medications without change.  Plan of care communicated with patient     Coding guidelines for this visit:  Type of Medical   Decision Making Face-to-Face Visit       within 7 Days of discharge Face-to-Face Visit        within 14 days of discharge   Moderate Complexity 51640 [id number]   High Complexity 52705 [id number]            Patient also complains of chronic neck and back pain.      Past Medical History:   Diagnosis Date     Allergy      Arthritis     spine and neck     Asthma      Gastroesophageal reflux disease      Ulcer, gastric, acute      Urticaria     Gets hives unknown origin       Review of Systems   Respiratory: Negative for shortness of breath.    Cardiovascular: Negative for chest pain, palpitations and leg swelling.   Gastrointestinal: Negative for nausea and vomiting.   Neurological: Negative for weakness, light-headedness, numbness and headaches.       BP (!) 136/90 (BP Location: Right arm, Patient Position: Sitting, Cuff Size: Adult Large)   Pulse 78   Temp 98.1  F (36.7  C) (Oral)   Ht 1.79 m (5' 10.47\")   Wt 98.9 kg (218 lb)   SpO2 97%   BMI 30.86 " kg/m        Physical Exam  Vitals signs and nursing note reviewed.   Constitutional:       General: He is not in acute distress.  Neck:      Thyroid: No thyromegaly.   Cardiovascular:      Rate and Rhythm: Normal rate and regular rhythm.      Heart sounds: Normal heart sounds.   Pulmonary:      Effort: Pulmonary effort is normal. No respiratory distress.      Breath sounds: Normal breath sounds.   Neurological:      Mental Status: He is alert and oriented to person, place, and time.      Coordination: Coordination normal.           ICD-10-CM    1. Uncomplicated alcohol dependence (H) F10.20    2. Chronic neck and back pain M54.2 PAIN MANAGEMENT REFERRAL    M54.9     G89.29    3. Need for prophylactic vaccination and inoculation against influenza Z23 INFLUENZA QUAD, RECOMBINANT, P-FREE (RIV4) (FLUBLOCK) [64887]     Vaccine Administration, Initial [81037]       Patient Instructions   Follow up with pain specialist.    Continue with your AA meetings and support groups.    Call doctor if you develop concerns over relapsing with alcohol use.

## 2020-02-12 NOTE — TELEPHONE ENCOUNTER
Methocarbamol 750 mg    Last Written Prescription Date:  07/03/18  Last Fill Quantity: 30 tablets,  # refills: 0   Last office visit: 7/3/2018 with prescribing provider:  Jj   Future Office Visit:      Routing refill request to provider for review/approval because:  Drug not on the [facility], P or [facility] refill protocol or controlled substance    
Patient had establish care appointment with Dr Gardner on 6/14/18 but PCP is still listed as Dr Henderson.  Unsure who patient is seeing as PCP.    Sarah Mckeon, RT (R)    
Second request from pharmacy  Raquel LO(R)    
Consultant

## 2020-02-20 ENCOUNTER — TELEPHONE (OUTPATIENT)
Dept: PALLIATIVE MEDICINE | Facility: CLINIC | Age: 60
End: 2020-02-20

## 2020-02-20 ASSESSMENT — ENCOUNTER SYMPTOMS
NUMBNESS: 0
PALPITATIONS: 0
WEAKNESS: 0
VOMITING: 0
HEADACHES: 0
NAUSEA: 0
LIGHT-HEADEDNESS: 0
SHORTNESS OF BREATH: 0

## 2020-02-20 NOTE — TELEPHONE ENCOUNTER
Please review and advise on scheduling.      Your provider has referred you to: [facility]: [facility] -   Reason for Referral: Evaluation for comprehensive services- patients will be evaluated if appropriate for comprehensive service including medication changes, procedures, pain psychology, and pain physical therapy. While involved with comprehensive services, pain providers will work with referring provider/PCP to stabilize appropriate medication management, with long-term plan of transition of prescribing back to referring provider/PCP upon completion of comprehensive services.   Please complete the following questions:  Do you have any specific questions for the pain specialist? No  Are there any red flags that may impact the assessment or management of the patient? Active or recent alcohol, drug or prescription medication misuse: see EMR record  What is your diagnosis for the patient's pain? chronic neck and back pain  For any questions, contact the [facility] at [phone].         Jerome FARMER    [facility]

## 2020-02-25 NOTE — TELEPHONE ENCOUNTER
Reached out to provider on 2/20.  Waiting for return message.  Ultimately will likely plan to schedule with Koir Larkin MD but referring provider may need to call and talk with patient first.    Ronel Jordan MD  [facility]

## 2020-03-10 NOTE — TELEPHONE ENCOUNTER
I have not heard back from primary   Please schedule with Dr. Larkin.    Ronel Jordan MD  [facility]

## 2020-03-11 NOTE — TELEPHONE ENCOUNTER
Pt declined services.    [facility]    [facility]

## 2020-07-14 ENCOUNTER — OFFICE VISIT (OUTPATIENT)
Dept: FAMILY MEDICINE | Facility: CLINIC | Age: 60
End: 2020-07-14
Payer: COMMERCIAL

## 2020-07-14 VITALS
DIASTOLIC BLOOD PRESSURE: 80 MMHG | OXYGEN SATURATION: 96 % | BODY MASS INDEX: 30.62 KG/M2 | HEART RATE: 73 BPM | SYSTOLIC BLOOD PRESSURE: 132 MMHG | WEIGHT: 213.9 LBS | HEIGHT: 70 IN | TEMPERATURE: 98.2 F

## 2020-07-14 DIAGNOSIS — Z13.220 LIPID SCREENING: ICD-10-CM

## 2020-07-14 DIAGNOSIS — Z00.00 ROUTINE HISTORY AND PHYSICAL EXAMINATION OF ADULT: Primary | ICD-10-CM

## 2020-07-14 DIAGNOSIS — M79.641 PAIN OF RIGHT HAND: ICD-10-CM

## 2020-07-14 DIAGNOSIS — M72.0 DUPUYTREN'S CONTRACTURE OF RIGHT HAND: ICD-10-CM

## 2020-07-14 DIAGNOSIS — Z12.5 SCREENING FOR PROSTATE CANCER: ICD-10-CM

## 2020-07-14 LAB
BASOPHILS # BLD AUTO: 0 10E9/L (ref 0–0.2)
BASOPHILS NFR BLD AUTO: 0.2 %
DIFFERENTIAL METHOD BLD: ABNORMAL
EOSINOPHIL # BLD AUTO: 0.3 10E9/L (ref 0–0.7)
EOSINOPHIL NFR BLD AUTO: 3.3 %
ERYTHROCYTE [DISTWIDTH] IN BLOOD BY AUTOMATED COUNT: 13.4 % (ref 10–15)
HCT VFR BLD AUTO: 43.3 % (ref 40–53)
HGB BLD-MCNC: 15.9 G/DL (ref 13.3–17.7)
LYMPHOCYTES # BLD AUTO: 2.3 10E9/L (ref 0.8–5.3)
LYMPHOCYTES NFR BLD AUTO: 23.1 %
MCH RBC QN AUTO: 31.2 PG (ref 26.5–33)
MCHC RBC AUTO-ENTMCNC: 36.7 G/DL (ref 31.5–36.5)
MCV RBC AUTO: 85 FL (ref 78–100)
MONOCYTES # BLD AUTO: 0.7 10E9/L (ref 0–1.3)
MONOCYTES NFR BLD AUTO: 6.7 %
NEUTROPHILS # BLD AUTO: 6.7 10E9/L (ref 1.6–8.3)
NEUTROPHILS NFR BLD AUTO: 66.7 %
PLATELET # BLD AUTO: 247 10E9/L (ref 150–450)
RBC # BLD AUTO: 5.1 10E12/L (ref 4.4–5.9)
WBC # BLD AUTO: 10.1 10E9/L (ref 4–11)

## 2020-07-14 PROCEDURE — 99213 OFFICE O/P EST LOW 20 MIN: CPT | Mod: 25 | Performed by: INTERNAL MEDICINE

## 2020-07-14 PROCEDURE — 80061 LIPID PANEL: CPT | Performed by: INTERNAL MEDICINE

## 2020-07-14 PROCEDURE — 80053 COMPREHEN METABOLIC PANEL: CPT | Performed by: INTERNAL MEDICINE

## 2020-07-14 PROCEDURE — 99396 PREV VISIT EST AGE 40-64: CPT | Performed by: INTERNAL MEDICINE

## 2020-07-14 PROCEDURE — G0103 PSA SCREENING: HCPCS | Performed by: INTERNAL MEDICINE

## 2020-07-14 PROCEDURE — 36415 COLL VENOUS BLD VENIPUNCTURE: CPT | Performed by: INTERNAL MEDICINE

## 2020-07-14 PROCEDURE — 85025 COMPLETE CBC W/AUTO DIFF WBC: CPT | Performed by: INTERNAL MEDICINE

## 2020-07-14 ASSESSMENT — MIFFLIN-ST. JEOR: SCORE: 1798.95

## 2020-07-14 NOTE — PROGRESS NOTES
SUBJECTIVE:   CC: Pierce Sullivan is an 60 year old male who presents for preventative health visit.       Patient has had chronic contraction of the ulnar side of his right hand which is started to significantly limit his diet activities and also causes undue pain.    Otherwise, he feels well and did not present any other subjective complaints.      Healthy Habits:    Getting at least 3 servings of Calcium per day:  NO    Bi-annual eye exam:  NO    Dental care twice a year:  Yes    Sleep apnea or symptoms of sleep apnea:  Sleep apnea    Diet:  Regular (no restrictions)    Frequency of exercise:  4-5 days/week    Duration of exercise:  30-45 minutes    Taking medications regularly:  Not Applicable    Barriers to taking medications:  Not applicable    Medication side effects:  Not applicable    PHQ-2 Total Score:    Additional concerns today:  Yes      Today's PHQ-2 Score:   PHQ-2 ( 1999 Pfizer) 7/14/2020   Q1: Little interest or pleasure in doing things 0   Q2: Feeling down, depressed or hopeless 0   PHQ-2 Score 0       Abuse: Current or Past(Physical, Sexual or Emotional)- No  Do you feel safe in your environment? Yes    Have you ever done Advance Care Planning? (For example, a Health Directive, POLST, or a discussion with a medical provider or your loved ones about your wishes): No, advance care planning information given to patient to review.  Patient plans to discuss their wishes with loved ones or provider.      Social History     Tobacco Use     Smoking status: Current Every Day Smoker     Packs/day: 0.50     Years: 30.00     Pack years: 15.00     Types: Cigarettes     Smokeless tobacco: Never Used   Substance Use Topics     Alcohol use: Yes     Comment: Had been sober for years, started drinking 1 wk ago       Last PSA:   PSA   Date Value Ref Range Status   07/14/2020 0.71 0 - 4 ug/L Final     Comment:     Assay Method:  Chemiluminescence using Siemens Vista analyzer       Reviewed orders with patient.  Reviewed health maintenance and updated orders accordingly - Yes      Reviewed and updated as needed this visit by clinical staff  Tobacco  Allergies  Meds  Problems  Med Hx  Surg Hx       Reviewed and updated as needed this visit by Provider  Allergies  Meds  Problems  Med Hx  Surg Hx        Past Medical History:   Diagnosis Date     Allergy      Arthritis     spine and neck     Asthma      Gastroesophageal reflux disease      Ulcer, gastric, acute      Urticaria     Gets hives unknown origin       Past Surgical History:   Procedure Laterality Date     COLONOSCOPY       HERNIA REPAIR       ORTHOPEDIC SURGERY      torn quadrecep repair     Quadracep Tear Right     Repair       Family History   Problem Relation Age of Onset     Other - See Comments Mother         cardiac dysrhythmia     Other Cancer Father         melanoma     Other Cancer Sister         thyroid Ca     Hypertension Other         uncertain     Coronary Artery Disease Paternal Grandfather      Diabetes No family hx of      Colon Cancer No family hx of      Prostate Cancer No family hx of      Cerebrovascular Disease No family hx of        Social History     Tobacco Use     Smoking status: Current Every Day Smoker     Packs/day: 0.50     Years: 30.00     Pack years: 15.00     Types: Cigarettes     Smokeless tobacco: Never Used   Substance Use Topics     Alcohol use: Yes     Comment: Had been sober for years, started drinking 1 wk ago       Review of Systems   Constitutional: Negative for chills, fatigue and fever.   HENT: Negative for congestion, ear pain, hearing loss, sore throat and trouble swallowing.    Eyes: Negative for pain and visual disturbance.   Respiratory: Negative for cough and shortness of breath.    Cardiovascular: Negative for chest pain and palpitations.   Gastrointestinal: Negative for abdominal pain, blood in stool, constipation, diarrhea, nausea and vomiting.   Genitourinary: Negative for difficulty urinating and  "testicular pain.   Musculoskeletal: Positive for arthralgias (see HPI). Negative for back pain, myalgias and neck pain.   Skin: Negative for rash.   Neurological: Negative for dizziness, light-headedness, numbness and headaches.   Psychiatric/Behavioral: Negative for sleep disturbance. The patient is not nervous/anxious.        OBJECTIVE:   /80 (BP Location: Right arm, Patient Position: Sitting, Cuff Size: Adult Large)   Pulse 73   Temp 98.2  F (36.8  C) (Tympanic)   Ht 1.79 m (5' 10.47\")   Wt 97 kg (213 lb 14.4 oz)   SpO2 96%   BMI 30.28 kg/m      Physical Exam  Vitals signs and nursing note reviewed.   Constitutional:       General: He is not in acute distress.  HENT:      Right Ear: External ear normal.      Left Ear: External ear normal.      Mouth/Throat:      Mouth: Oropharynx is clear and moist.   Eyes:      Conjunctiva/sclera: Conjunctivae normal.      Pupils: Pupils are equal, round, and reactive to light.   Neck:      Thyroid: No thyromegaly.   Cardiovascular:      Rate and Rhythm: Normal rate and regular rhythm.      Heart sounds: Normal heart sounds.   Pulmonary:      Effort: Pulmonary effort is normal. No respiratory distress.      Breath sounds: Normal breath sounds.   Abdominal:      Palpations: Abdomen is soft.      Tenderness: There is no abdominal tenderness.   Genitourinary:     Penis: No discharge.    Musculoskeletal: Normal range of motion.         General: Deformity (Contracture of the ulnar half of the right hand) present. No tenderness or edema.   Lymphadenopathy:      Cervical: No cervical adenopathy.   Skin:     Findings: No rash.   Neurological:      Mental Status: He is alert and oriented to person, place, and time.      Coordination: Coordination normal.   Psychiatric:         Mood and Affect: Mood and affect normal.       ASSESSMENT/PLAN:       ICD-10-CM    1. Routine history and physical examination of adult  Z00.00 CBC with platelets differential     Comprehensive " "metabolic panel   2. Dupuytren's contracture of right hand  M72.0 Orthopedic & Spine  Referral   3. Pain of right hand  M79.641 diclofenac (VOLTAREN) 1 % topical gel   4. Lipid screening  Z13.220 Lipid panel reflex to direct LDL Fasting   5. Screening for prostate cancer  Z12.5 Prostate spec antigen screen       Patient Instructions   Follow up with hand specialist and your spine doctor.    Wear wrist brace at work and during sleep.    Follow up yearly or as needed.      COUNSELING:   Reviewed preventive health counseling, as reflected in patient instructions    Estimated body mass index is 30.28 kg/m  as calculated from the following:    Height as of this encounter: 1.79 m (5' 10.47\").    Weight as of this encounter: 97 kg (213 lb 14.4 oz).     Weight management plan: Discussed healthy diet and exercise guidelines     reports that he has been smoking cigarettes. He has a 15.00 pack-year smoking history. He has never used smokeless tobacco.      Counseling Resources:  ATP IV Guidelines  Pooled Cohorts Equation Calculator  FRAX Risk Assessment  ICSI Preventive Guidelines  Dietary Guidelines for Americans, 2010  USDA's MyPlate  ASA Prophylaxis  Lung CA Screening    Charles Gardner MD  [facility]  "

## 2020-07-14 NOTE — PATIENT INSTRUCTIONS
Follow up with hand specialist and your spine doctor.    Wear wrist brace at work and during sleep.    Follow up yearly or as needed.

## 2020-07-15 LAB
ALBUMIN SERPL-MCNC: 4 G/DL (ref 3.4–5)
ALP SERPL-CCNC: 93 U/L (ref 40–150)
ALT SERPL W P-5'-P-CCNC: 29 U/L (ref 0–70)
ANION GAP SERPL CALCULATED.3IONS-SCNC: 6 MMOL/L (ref 3–14)
AST SERPL W P-5'-P-CCNC: 20 U/L (ref 0–45)
BILIRUB SERPL-MCNC: 0.9 MG/DL (ref 0.2–1.3)
BUN SERPL-MCNC: 10 MG/DL (ref 7–30)
CALCIUM SERPL-MCNC: 8.9 MG/DL (ref 8.5–10.1)
CHLORIDE SERPL-SCNC: 110 MMOL/L (ref 94–109)
CHOLEST SERPL-MCNC: 152 MG/DL
CO2 SERPL-SCNC: 22 MMOL/L (ref 20–32)
CREAT SERPL-MCNC: 0.77 MG/DL (ref 0.66–1.25)
GFR SERPL CREATININE-BSD FRML MDRD: >90 ML/MIN/{1.73_M2}
GLUCOSE SERPL-MCNC: 103 MG/DL (ref 70–99)
HDLC SERPL-MCNC: 45 MG/DL
LDLC SERPL CALC-MCNC: 89 MG/DL
NONHDLC SERPL-MCNC: 107 MG/DL
POTASSIUM SERPL-SCNC: 4.1 MMOL/L (ref 3.4–5.3)
PROT SERPL-MCNC: 7.5 G/DL (ref 6.8–8.8)
PSA SERPL-ACNC: 0.71 UG/L (ref 0–4)
SODIUM SERPL-SCNC: 138 MMOL/L (ref 133–144)
TRIGL SERPL-MCNC: 92 MG/DL

## 2020-07-28 ASSESSMENT — ENCOUNTER SYMPTOMS
HEADACHES: 0
CONSTIPATION: 0
DIFFICULTY URINATING: 0
COUGH: 0
EYE PAIN: 0
CHILLS: 0
BACK PAIN: 0
DIZZINESS: 0
NERVOUS/ANXIOUS: 0
TROUBLE SWALLOWING: 0
FATIGUE: 0
BLOOD IN STOOL: 0
VOMITING: 0
DIARRHEA: 0
ABDOMINAL PAIN: 0
MYALGIAS: 0
NECK PAIN: 0
SLEEP DISTURBANCE: 0
FEVER: 0
NUMBNESS: 0
PALPITATIONS: 0
NAUSEA: 0
SORE THROAT: 0
SHORTNESS OF BREATH: 0
LIGHT-HEADEDNESS: 0
ARTHRALGIAS: 1

## 2021-01-15 ENCOUNTER — HEALTH MAINTENANCE LETTER (OUTPATIENT)
Age: 61
End: 2021-01-15

## 2021-04-08 ENCOUNTER — IMMUNIZATION (OUTPATIENT)
Dept: NURSING | Facility: CLINIC | Age: 61
End: 2021-04-08
Payer: COMMERCIAL

## 2021-04-08 PROCEDURE — 0001A PR COVID VAC PFIZER DIL RECON 30 MCG/0.3 ML IM: CPT

## 2021-04-08 PROCEDURE — 91300 PR COVID VAC PFIZER DIL RECON 30 MCG/0.3 ML IM: CPT

## 2021-04-29 ENCOUNTER — IMMUNIZATION (OUTPATIENT)
Dept: NURSING | Facility: CLINIC | Age: 61
End: 2021-04-29
Attending: INTERNAL MEDICINE
Payer: COMMERCIAL

## 2021-04-29 PROCEDURE — 91300 PR COVID VAC PFIZER DIL RECON 30 MCG/0.3 ML IM: CPT

## 2021-04-29 PROCEDURE — 0002A PR COVID VAC PFIZER DIL RECON 30 MCG/0.3 ML IM: CPT

## 2021-08-29 ENCOUNTER — HEALTH MAINTENANCE LETTER (OUTPATIENT)
Age: 61
End: 2021-08-29

## 2021-10-24 ENCOUNTER — HEALTH MAINTENANCE LETTER (OUTPATIENT)
Age: 61
End: 2021-10-24

## 2022-06-07 ENCOUNTER — OFFICE VISIT (OUTPATIENT)
Dept: URGENT CARE | Facility: URGENT CARE | Age: 62
End: 2022-06-07
Payer: COMMERCIAL

## 2022-06-07 VITALS
HEART RATE: 81 BPM | WEIGHT: 179 LBS | SYSTOLIC BLOOD PRESSURE: 149 MMHG | BODY MASS INDEX: 25.34 KG/M2 | TEMPERATURE: 97.7 F | DIASTOLIC BLOOD PRESSURE: 88 MMHG

## 2022-06-07 DIAGNOSIS — S61.211A LACERATION OF LEFT INDEX FINGER WITHOUT FOREIGN BODY WITHOUT DAMAGE TO NAIL, INITIAL ENCOUNTER: Primary | ICD-10-CM

## 2022-06-07 PROCEDURE — 99213 OFFICE O/P EST LOW 20 MIN: CPT | Mod: 25 | Performed by: PHYSICIAN ASSISTANT

## 2022-06-07 PROCEDURE — 90715 TDAP VACCINE 7 YRS/> IM: CPT | Performed by: PHYSICIAN ASSISTANT

## 2022-06-07 PROCEDURE — 90471 IMMUNIZATION ADMIN: CPT | Performed by: PHYSICIAN ASSISTANT

## 2022-06-07 RX ORDER — CEPHALEXIN 500 MG/1
500 CAPSULE ORAL 3 TIMES DAILY
Qty: 21 CAPSULE | Refills: 0 | Status: SHIPPED | OUTPATIENT
Start: 2022-06-07 | End: 2022-06-14

## 2022-06-07 NOTE — PATIENT INSTRUCTIONS
(B95.529R) Laceration of left index finger without foreign body without damage to nail, initial encounter  (primary encounter diagnosis)  Comment:   Plan: Wound cleaned and dressed in clinic.  Local wound care daily.  Keflex as directed.  Tetanus updated.

## 2022-06-07 NOTE — PROGRESS NOTES
Patient presents with:  Urgent Care: laceration of left index finger from a sharp chisel - incident happened around 2-3pm yesterday.    (S61.211A) Laceration of left index finger without foreign body without damage to nail, initial encounter  (primary encounter diagnosis)  Comment:   Plan: Wound cleaned and dressed in clinic.  Local wound care daily.  Keflex as directed.  Tetanus updated.            Immunization History   Administered Date(s) Administered     COVID-19,PF,Pfizer (12+ Yrs) 04/08/2021, 04/29/2021     FLU 6-35 months 01/10/2013, 10/29/2018     Influenza Quad, Recombinant, pf(RIV4) (Flublok) 02/11/2020     Influenza Vaccine IM > 6 months Valent IIV4 (Alfuria,Fluzone) 11/19/2014     TDAP Vaccine (Adacel) 11/03/2012     Zoster vaccine, live 01/30/2013     Pierec Sullivan is a 61 year old male who presents to the clinic with a laceration on the left index finger sustained yesterday on a sharp chisel about 20 hours ago.   This is a non-work related injury.  Mechanism of injury: as above.    Associated symptoms: Denies numbness, weakness, or loss of function  Last tetanus booster within 10 years: no    Patient Active Problem List   Diagnosis     Cervicalgia     Cigarette nicotine dependence without complication     Alcohol dependence with withdrawal      Hyponatremia     Hypokalemia     Alcoholic hepatitis without ascites     Smoker     Nausea with vomiting     Thrombocytopenia (H)       Social History     Socioeconomic History     Marital status:      Spouse name: Not on file     Number of children: Not on file     Years of education: Not on file     Highest education level: Not on file   Occupational History     Not on file   Tobacco Use     Smoking status: Current Every Day Smoker     Packs/day: 0.50     Years: 30.00     Pack years: 15.00     Types: Cigarettes     Smokeless tobacco: Never Used   Substance and Sexual Activity     Alcohol use: Yes     Comment: Had been sober for years, started drinking  1 wk ago     Drug use: No     Sexual activity: Not on file   Other Topics Concern     Parent/sibling w/ CABG, MI or angioplasty before 65F 55M? Not Asked   Social History Narrative     Not on file     Social Determinants of Health     Financial Resource Strain: Not on file   Food Insecurity: Not on file   Transportation Needs: Not on file   Physical Activity: Not on file   Stress: Not on file   Social Connections: Not on file   Intimate Partner Violence: Not on file   Housing Stability: Not on file       Current Outpatient Medications   Medication Sig Dispense Refill     cephALEXin (KEFLEX) 500 MG capsule Take 1 capsule (500 mg) by mouth 3 times daily for 7 days 21 capsule 0     acetaminophen (TYLENOL) 325 MG tablet Take 2 tablets (650 mg) by mouth every 4 hours as needed for mild pain or fever (Patient not taking: Reported on 6/7/2022)  0     aspirin (ASA) 325 MG EC tablet Take 2-3 tablets (650-975 mg) by mouth daily as needed for moderate pain (Patient not taking: Reported on 6/7/2022)  0     Blood Pressure Monitoring KIT Use as directed (Patient not taking: Reported on 6/7/2022) 1 kit 0     diclofenac (VOLTAREN) 1 % topical gel Place 2 g onto the skin 4 times daily as needed (hand pain) (Patient not taking: Reported on 6/7/2022) 350 g 3     EPINEPHrine (EPIPEN/ADRENACLICK/OR ANY BX GENERIC EQUIV) 0.3 MG/0.3ML injection 2-pack Inject 0.3 mg into the muscle as needed for anaphylaxis (Patient not taking: Reported on 6/7/2022)       famotidine (PEPCID) 40 MG tablet 40 mg bid for 1 week, then 40 mg bid prn heart burn (Patient not taking: Reported on 6/7/2022) 60 tablet 0       EXAM: The patient appears today alert and not in distress  VITALS: Blood pressure (!) 149/88, pulse 81, temperature 97.7  F (36.5  C), temperature source Tympanic, weight 81.2 kg (179 lb).    Size of laceration: 2 centimeters  Characteristics of the laceration: straight and just through dermis  Tendon function intact: yes  Sensation to light  touch intact: yes  Pulses intact: yes    Picture included in patient's chart: no    Assessment:  2 centimeter laceration    PLAN:    Wound cleaned with betadine/saline solution  Wound soaked  Wound irrigated  Dressed with bacitracin and bandage    After care instructions:  Keep wound clean and dry for the next 24-48 hours  Apply anti-bacterial ointment

## 2022-06-07 NOTE — PROGRESS NOTES
Prior to immunization administration, verified patients identity using patient s name and date of birth. Please see Immunization Activity for additional information.     Screening Questionnaire for Adult Immunization    Are you sick today?   No   Do you have allergies to medications, food, a vaccine component or latex?   No   Have you ever had a serious reaction after receiving a vaccination?   No   Do you have a long-term health problem with heart, lung, kidney, or metabolic disease (e.g., diabetes), asthma, a blood disorder, no spleen, complement component deficiency, a cochlear implant, or a spinal fluid leak?  Are you on long-term aspirin therapy?   No   Do you have cancer, leukemia, HIV/AIDS, or any other immune system problem?   No   Do you have a parent, brother, or sister with an immune system problem?   No   In the past 3 months, have you taken medications that affect  your immune system, such as prednisone, other steroids, or anticancer drugs; drugs for the treatment of rheumatoid arthritis, Crohn s disease, or psoriasis; or have you had radiation treatments?   No   Have you had a seizure, or a brain or other nervous system problem?   No   During the past year, have you received a transfusion of blood or blood    products, or been given immune (gamma) globulin or antiviral drug?   No   For women: Are you pregnant or is there a chance you could become       pregnant during the next month?   No   Have you received any vaccinations in the past 4 weeks?   No     Immunization questionnaire answers were all negative.        Per orders of Anna LOMAS, injection of TDAP  given by Nabila Estrada CMA. Patient instructed to remain in clinic for 15 minutes afterwards, and to report any adverse reaction to me immediately.       Screening performed by Nabila Estrada CMA on 6/7/2022 at 10:57 AM.

## 2022-10-16 ENCOUNTER — HEALTH MAINTENANCE LETTER (OUTPATIENT)
Age: 62
End: 2022-10-16

## 2022-11-29 ENCOUNTER — VIRTUAL VISIT (OUTPATIENT)
Dept: FAMILY MEDICINE | Facility: CLINIC | Age: 62
End: 2022-11-29
Payer: COMMERCIAL

## 2022-11-29 DIAGNOSIS — U07.1 INFECTION DUE TO 2019 NOVEL CORONAVIRUS: Primary | ICD-10-CM

## 2022-11-29 PROCEDURE — 99213 OFFICE O/P EST LOW 20 MIN: CPT | Mod: GT | Performed by: PHYSICIAN ASSISTANT

## 2022-11-29 NOTE — PROGRESS NOTES
Pierce is a 62 year old who is being evaluated via a billable video visit.      How would you like to obtain your AVS? MyChart  If the video visit is dropped, the invitation should be resent by: Text to cell phone: [phone]  Will anyone else be joining your video visit? No          Assessment & Plan     Infection due to 2019 novel coronavirus  Given hx of alcohol use and smoking will treat.  Most recent labs were unremarkable.    - nirmatrelvir and ritonavir (PAXLOVID) therapy pack; Take 3 tablets by mouth 2 times daily for 5 days (Take 2 Nirmatrelvir tablets and 1 Ritonavir tablet twice daily for 5 days)             Nicotine/Tobacco Cessation:  He reports that he has been smoking cigarettes. He has a 15.00 pack-year smoking history. He has never used smokeless tobacco.  Nicotine/Tobacco Cessation Plan:   did not address          No follow-ups on file.    Lashonda Stringer PA-C  [facility]    Subjective   Pierce is a 62 year old accompanied by his self , presenting for the following health issues:  Covid Concern (Positive this morning )      HPI       COVID-19 Symptom Review  How many days ago did these symptoms start? Today and positive Covid test this morning     Are any of the following symptoms significant for you?    New or worsening difficulty breathing? Yes-not really     Worsening cough? mild    Fever or chills? I do not know,has chills     Headache: YES    Sore throat: No    Chest pain: No    Diarrhea: YES    Body aches? YES  Tired -very tired and lots of body aches   What treatments has patient tried? None    Does patient live in a nursing home, group home, or shelter? No  Does patient have a way to get food/medications during quarantined? Yes, I have a friend or family member who can help me.                  Review of Systems   As above      Objective           Vitals:  No vitals were obtained today due to virtual visit.    Physical Exam   GENERAL: alert, lying in bed  EYES:  Eyes grossly normal to inspection.  No discharge or erythema, or obvious scleral/conjunctival abnormalities.  RESP: No audible wheeze, cough, or visible cyanosis.  No visible retractions or increased work of breathing.    SKIN: Visible skin clear. No significant rash, abnormal pigmentation or lesions.  NEURO: Cranial nerves grossly intact.  Mentation and speech appropriate for age.  PSYCH: Mentation appears normal, affect normal/bright, judgement and insight intact, normal speech and appearance well-groomed.                Video-Visit Details    Video Start Time: 2:27 PM    Type of service:  Video Visit    Video End Time:2:35 PM    Originating Location (pt. Location): Home        Distant Location (provider location):  On-site    Platform used for Video Visit: Adreima

## 2022-12-09 ENCOUNTER — HOSPITAL ENCOUNTER (OUTPATIENT)
Facility: CLINIC | Age: 62
End: 2022-12-09
Attending: COLON & RECTAL SURGERY | Admitting: COLON & RECTAL SURGERY
Payer: COMMERCIAL

## 2022-12-21 ENCOUNTER — VIRTUAL VISIT (OUTPATIENT)
Dept: FAMILY MEDICINE | Facility: CLINIC | Age: 62
End: 2022-12-21
Payer: COMMERCIAL

## 2022-12-21 DIAGNOSIS — R05.9 COUGH, UNSPECIFIED TYPE: Primary | ICD-10-CM

## 2022-12-21 PROCEDURE — 99213 OFFICE O/P EST LOW 20 MIN: CPT | Mod: TEL | Performed by: PHYSICIAN ASSISTANT

## 2022-12-21 RX ORDER — PREDNISONE 20 MG/1
40 TABLET ORAL DAILY
Qty: 10 TABLET | Refills: 0 | Status: SHIPPED | OUTPATIENT
Start: 2022-12-21

## 2022-12-21 RX ORDER — OMEPRAZOLE 40 MG/1
40 CAPSULE, DELAYED RELEASE ORAL DAILY
Qty: 30 CAPSULE | Refills: 0 | Status: SHIPPED | OUTPATIENT
Start: 2022-12-21

## 2022-12-21 NOTE — PROGRESS NOTES
Pierce is a 62 year old who is being evaluated via a billable telephone visit.      What phone number would you like to be contacted at? [phone]  How would you like to obtain your AVS? Ni    Distant Location (provider location):  On-site    Assessment and Plan:     (R05.9) Cough, unspecified type  (primary encounter diagnosis)  Comment: had covid recently, overall better but continues to cough, mostly dry, no fever, no chest pain, sates he is no longer drinking   Plan: predniSONE (DELTASONE) 20 MG tablet, omeprazole        (PRILOSEC) 40 MG DR capsule  Take prednisone in the am with food  prilosec while on prednisone  See me next week in person if not better, sooner if worsens  Establish care with pcp in next 6 months       Brigida Souza PA-C        Subjective   Pierce is a 62 year old presenting for the following health issues:  Cough      HPI     Pierce was recently diagnosed with covid   He is feeling better overall but continues to cough  The cough worsens at bedtime  The cough is occasionally productive  He has noticed some wheezing  He denies fever/chills, chest pain, leg swelling, hemoptysis     Review of Systems   See above      Objective           Vitals:  No vitals were obtained today due to virtual visit.    Physical Exam   No exam, phone visit  Voice normal        Phone call duration: 8 minutes

## 2022-12-21 NOTE — PATIENT INSTRUCTIONS
Take prednisone with food in the morning  Take prilosec while on prednisone  See me next week in person if not better  Make an establish care visit in the next 6 months with a primary provider  
Chitra Larose  (RN)  2022 11:04:35

## 2023-11-04 ENCOUNTER — HEALTH MAINTENANCE LETTER (OUTPATIENT)
Age: 63
End: 2023-11-04

## 2024-12-22 ENCOUNTER — HEALTH MAINTENANCE LETTER (OUTPATIENT)
Age: 64
End: 2024-12-22

## (undated) RX ORDER — FENTANYL CITRATE 50 UG/ML
INJECTION, SOLUTION INTRAMUSCULAR; INTRAVENOUS
Status: DISPENSED
Start: 2018-01-23